# Patient Record
Sex: MALE | Race: WHITE | NOT HISPANIC OR LATINO | Employment: UNEMPLOYED | URBAN - METROPOLITAN AREA
[De-identification: names, ages, dates, MRNs, and addresses within clinical notes are randomized per-mention and may not be internally consistent; named-entity substitution may affect disease eponyms.]

---

## 2021-01-04 ENCOUNTER — OFFICE VISIT (OUTPATIENT)
Dept: FAMILY MEDICINE | Facility: CLINIC | Age: 41
End: 2021-01-04
Payer: COMMERCIAL

## 2021-01-04 VITALS
HEART RATE: 81 BPM | WEIGHT: 156 LBS | OXYGEN SATURATION: 97 % | TEMPERATURE: 97.6 F | HEIGHT: 71 IN | DIASTOLIC BLOOD PRESSURE: 68 MMHG | SYSTOLIC BLOOD PRESSURE: 122 MMHG | BODY MASS INDEX: 21.84 KG/M2

## 2021-01-04 DIAGNOSIS — Z00.00 ROUTINE GENERAL MEDICAL EXAMINATION AT A HEALTH CARE FACILITY: Primary | ICD-10-CM

## 2021-01-04 DIAGNOSIS — Z11.59 NEED FOR HEPATITIS C SCREENING TEST: ICD-10-CM

## 2021-01-04 PROCEDURE — 99385 PREV VISIT NEW AGE 18-39: CPT | Performed by: PHYSICIAN ASSISTANT

## 2021-01-04 ASSESSMENT — ENCOUNTER SYMPTOMS
WEAKNESS: 0
JOINT SWELLING: 0
PALPITATIONS: 0
FREQUENCY: 0
SORE THROAT: 0
HEMATURIA: 0
NERVOUS/ANXIOUS: 0
NAUSEA: 0
CONSTIPATION: 0
HEARTBURN: 0
HEMATOCHEZIA: 0
ABDOMINAL PAIN: 0
SHORTNESS OF BREATH: 0
CHILLS: 0
DIARRHEA: 0
PARESTHESIAS: 0
DYSURIA: 0
FEVER: 0
COUGH: 0
HEADACHES: 0
MYALGIAS: 0
EYE PAIN: 0
DIZZINESS: 0
ARTHRALGIAS: 0

## 2021-01-04 ASSESSMENT — PAIN SCALES - GENERAL: PAINLEVEL: NO PAIN (0)

## 2021-01-04 ASSESSMENT — MIFFLIN-ST. JEOR: SCORE: 1644.74

## 2021-01-04 NOTE — PROGRESS NOTES
SUBJECTIVE:   CC: Michael Thompson is an 40 year old male who presents for preventative health visit.       Patient has been advised of split billing requirements and indicates understanding: Yes  Healthy Habits:     Getting at least 3 servings of Calcium per day:  Yes    Bi-annual eye exam:  NO    Dental care twice a year:  Yes    Sleep apnea or symptoms of sleep apnea:  None    Diet:  Regular (no restrictions)    Frequency of exercise:  2-3 days/week    Duration of exercise:  30-45 minutes    Taking medications regularly:  Not Applicable    Medication side effects:  None    PHQ-2 Total Score: 0    Additional concerns today:  No        Today's PHQ-2 Score:   PHQ-2 ( 1999 [employer]) 1/4/2021   Q1: Little interest or pleasure in doing things 0   Q2: Feeling down, depressed or hopeless 0   PHQ-2 Score 0   Q1: Little interest or pleasure in doing things Not at all   Q2: Feeling down, depressed or hopeless Not at all   PHQ-2 Score 0       Abuse: Current or Past(Physical, Sexual or Emotional)- No  Do you feel safe in your environment? Yes        Social History     Tobacco Use     Smoking status: Never Smoker     Smokeless tobacco: Never Used   Substance Use Topics     Alcohol use: Yes     Comment: social     If you drink alcohol do you typically have >3 drinks per day or >7 drinks per week? No    Alcohol Use 1/4/2021   Prescreen: >3 drinks/day or >7 drinks/week? No   No flowsheet data found.    Last PSA: No results found for: PSA    Reviewed orders with patient. Reviewed health maintenance and updated orders accordingly - Yes  BP Readings from Last 3 Encounters:   01/04/21 122/68    Wt Readings from Last 3 Encounters:   01/04/21 70.8 kg (156 lb)                  There is no problem list on file for this patient.    Past Surgical History:   Procedure Laterality Date     HERNIA REPAIR       lump removal  2013    right upper back     TONSILLECTOMY, ADENOIDECTOMY, COMBINED  2004       Social History     Tobacco Use      "Smoking status: Never Smoker     Smokeless tobacco: Never Used   Substance Use Topics     Alcohol use: Yes     Comment: social     Family History   Problem Relation Age of Onset     Anxiety Disorder Mother      Prostate Cancer Maternal Grandfather      Cancer Paternal Grandfather          No current outpatient medications on file.     No Known Allergies  No lab results found.     Reviewed and updated as needed this visit by clinical staff  Tobacco  Allergies  Meds   Med Hx  Surg Hx  Fam Hx  Soc Hx        Reviewed and updated as needed this visit by Provider                History reviewed. No pertinent past medical history.   Past Surgical History:   Procedure Laterality Date     HERNIA REPAIR       lump removal  2013    right upper back     TONSILLECTOMY, ADENOIDECTOMY, COMBINED  2004       Review of Systems   Constitutional: Negative for chills and fever.   HENT: Negative for congestion, ear pain, hearing loss and sore throat.    Eyes: Negative for pain and visual disturbance.   Respiratory: Negative for cough and shortness of breath.    Cardiovascular: Negative for chest pain, palpitations and peripheral edema.   Gastrointestinal: Negative for abdominal pain, constipation, diarrhea, heartburn, hematochezia and nausea.   Genitourinary: Negative for discharge, dysuria, frequency, genital sores, hematuria, impotence and urgency.   Musculoskeletal: Negative for arthralgias, joint swelling and myalgias.   Skin: Negative for rash.   Neurological: Negative for dizziness, weakness, headaches and paresthesias.   Psychiatric/Behavioral: Negative for mood changes. The patient is not nervous/anxious.        OBJECTIVE:   /68   Pulse 81   Temp 97.6  F (36.4  C) (Tympanic)   Ht 1.803 m (5' 11\")   Wt 70.8 kg (156 lb)   SpO2 97%   BMI 21.76 kg/m      Physical Exam  GENERAL: healthy, alert and no distress  EYES: Eyes grossly normal to inspection, PERRL and conjunctivae and sclerae normal  HENT: ear canals and " "TM's normal, nose and mouth without ulcers or lesions  NECK: no adenopathy, no asymmetry, masses, or scars and thyroid normal to palpation  RESP: lungs clear to auscultation - no rales, rhonchi or wheezes  CV: regular rate and rhythm, normal S1 S2, no S3 or S4, no murmur, click or rub, no peripheral edema and peripheral pulses strong  ABDOMEN: soft, nontender, no hepatosplenomegaly, no masses and bowel sounds normal  MS: no gross musculoskeletal defects noted, no edema  SKIN: no suspicious lesions or rashes  NEURO: Normal strength and tone, mentation intact and speech normal  PSYCH: mentation appears normal, affect normal/bright    Diagnostic Test Results:  Labs reviewed in Epic    ASSESSMENT/PLAN:   1. Routine general medical examination at a health care facility  Health maintenance reviewed and updated.  - Lipid panel reflex to direct LDL Fasting; Future  - **Glucose FUTURE anytime; Future    2. Need for hepatitis C screening test  - Hepatitis C Screen Reflex to HCV RNA Quant and Genotype; Future    Patient has been advised of split billing requirements and indicates understanding: Yes  COUNSELING:   Reviewed preventive health counseling, as reflected in patient instructions       Regular exercise       Healthy diet/nutrition    Estimated body mass index is 21.76 kg/m  as calculated from the following:    Height as of this encounter: 1.803 m (5' 11\").    Weight as of this encounter: 70.8 kg (156 lb).         He reports that he has never smoked. He has never used smokeless tobacco.      Counseling Resources:  ATP IV Guidelines  Pooled Cohorts Equation Calculator  FRAX Risk Assessment  ICSI Preventive Guidelines  Dietary Guidelines for Americans, 2010  USDA's MyPlate  ASA Prophylaxis  Lung CA Screening    Kristen M. Kehr, PA-C [facility]  "

## 2021-01-04 NOTE — NURSING NOTE
"Chief Complaint   Patient presents with     Physical       Initial /68   Pulse 81   Temp 97.6  F (36.4  C) (Tympanic)   Ht 1.803 m (5' 11\")   Wt 70.8 kg (156 lb)   SpO2 97%   BMI 21.76 kg/m   Estimated body mass index is 21.76 kg/m  as calculated from the following:    Height as of this encounter: 1.803 m (5' 11\").    Weight as of this encounter: 70.8 kg (156 lb).  Medication Reconciliation: complete    LUKASZ Castle MA    "

## 2021-01-04 NOTE — PATIENT INSTRUCTIONS
Schedule fasting lab tests and immunizations            Preventive Health Recommendations  Male Ages 26 - 39    Yearly exam:             See your health care provider every year in order to  o   Review health changes.   o   Discuss preventive care.    o   Review your medicines if your doctor has prescribed any.    You should be tested each year for STDs (sexually transmitted diseases), if you re at risk.     After age 35, talk to your provider about cholesterol testing. If you are at risk for heart disease, have your cholesterol tested at least every 5 years.     If you are at risk for diabetes, you should have a diabetes test (fasting glucose).  Shots: Get a flu shot each year. Get a tetanus shot every 10 years.     Nutrition:    Eat at least 5 servings of fruits and vegetables daily.     Eat whole-grain bread, whole-wheat pasta and brown rice instead of white grains and rice.     Get adequate Calcium and Vitamin D.     Lifestyle    Exercise for at least 150 minutes a week (30 minutes a day, 5 days a week). This will help you control your weight and prevent disease.     Limit alcohol to one drink per day.     No smoking.     Wear sunscreen to prevent skin cancer.     See your dentist every six months for an exam and cleaning.

## 2021-01-15 ENCOUNTER — HEALTH MAINTENANCE LETTER (OUTPATIENT)
Age: 41
End: 2021-01-15

## 2021-01-20 DIAGNOSIS — Z11.59 NEED FOR HEPATITIS C SCREENING TEST: ICD-10-CM

## 2021-01-20 DIAGNOSIS — Z00.00 ROUTINE GENERAL MEDICAL EXAMINATION AT A HEALTH CARE FACILITY: ICD-10-CM

## 2021-01-20 LAB
CHOLEST SERPL-MCNC: 210 MG/DL
GLUCOSE SERPL-MCNC: 89 MG/DL (ref 70–99)
HCV AB SERPL QL IA: NONREACTIVE
HDLC SERPL-MCNC: 74 MG/DL
LDLC SERPL CALC-MCNC: 117 MG/DL
NONHDLC SERPL-MCNC: 136 MG/DL
TRIGL SERPL-MCNC: 94 MG/DL

## 2021-01-20 PROCEDURE — 82947 ASSAY GLUCOSE BLOOD QUANT: CPT | Performed by: PHYSICIAN ASSISTANT

## 2021-01-20 PROCEDURE — 36415 COLL VENOUS BLD VENIPUNCTURE: CPT | Performed by: PHYSICIAN ASSISTANT

## 2021-01-20 PROCEDURE — 86803 HEPATITIS C AB TEST: CPT | Performed by: PHYSICIAN ASSISTANT

## 2021-01-20 PROCEDURE — 80061 LIPID PANEL: CPT | Performed by: PHYSICIAN ASSISTANT

## 2021-05-04 ENCOUNTER — TELEPHONE (OUTPATIENT)
Dept: NURSING | Facility: CLINIC | Age: 41
End: 2021-05-04

## 2021-05-04 NOTE — TELEPHONE ENCOUNTER
Patient calls with questions regarding covid-19 antibody testing. Patient is wondering if it is offered at [facility]. Patient is wondering if he had covid-19 in the past, does not currently have symptoms.   Patient is advised that the testing can be ordered, there also is a retail lab. Patient is going to speak with his insurance to see if it is covered and call back if he would like to have the order placed. Patient denies further questions at this time.    Margaret Patel RN  [facility]

## 2021-05-24 ENCOUNTER — IMMUNIZATION (OUTPATIENT)
Dept: LAB | Facility: CLINIC | Age: 41
End: 2021-05-24
Payer: COMMERCIAL

## 2021-09-24 ENCOUNTER — OFFICE VISIT (OUTPATIENT)
Dept: FAMILY MEDICINE | Facility: CLINIC | Age: 41
End: 2021-09-24
Payer: COMMERCIAL

## 2021-09-24 VITALS
HEIGHT: 71 IN | SYSTOLIC BLOOD PRESSURE: 126 MMHG | RESPIRATION RATE: 14 BRPM | DIASTOLIC BLOOD PRESSURE: 73 MMHG | WEIGHT: 156 LBS | HEART RATE: 84 BPM | TEMPERATURE: 97.8 F | OXYGEN SATURATION: 100 % | BODY MASS INDEX: 21.84 KG/M2

## 2021-09-24 DIAGNOSIS — R22.1 LOCALIZED SWELLING, MASS AND LUMP, NECK: Primary | ICD-10-CM

## 2021-09-24 LAB
BASOPHILS # BLD AUTO: 0 10E3/UL (ref 0–0.2)
BASOPHILS NFR BLD AUTO: 1 %
EOSINOPHIL # BLD AUTO: 0.2 10E3/UL (ref 0–0.7)
EOSINOPHIL NFR BLD AUTO: 3 %
ERYTHROCYTE [DISTWIDTH] IN BLOOD BY AUTOMATED COUNT: 12.6 % (ref 10–15)
HCT VFR BLD AUTO: 46.2 % (ref 40–53)
HGB BLD-MCNC: 16.1 G/DL (ref 13.3–17.7)
LYMPHOCYTES # BLD AUTO: 1.7 10E3/UL (ref 0.8–5.3)
LYMPHOCYTES NFR BLD AUTO: 32 %
MCH RBC QN AUTO: 30.2 PG (ref 26.5–33)
MCHC RBC AUTO-ENTMCNC: 34.8 G/DL (ref 31.5–36.5)
MCV RBC AUTO: 87 FL (ref 78–100)
MONOCYTES # BLD AUTO: 0.4 10E3/UL (ref 0–1.3)
MONOCYTES NFR BLD AUTO: 7 %
NEUTROPHILS # BLD AUTO: 2.9 10E3/UL (ref 1.6–8.3)
NEUTROPHILS NFR BLD AUTO: 57 %
PLATELET # BLD AUTO: 187 10E3/UL (ref 150–450)
RBC # BLD AUTO: 5.33 10E6/UL (ref 4.4–5.9)
WBC # BLD AUTO: 5.1 10E3/UL (ref 4–11)

## 2021-09-24 PROCEDURE — 36415 COLL VENOUS BLD VENIPUNCTURE: CPT | Performed by: PHYSICIAN ASSISTANT

## 2021-09-24 PROCEDURE — 99214 OFFICE O/P EST MOD 30 MIN: CPT | Performed by: PHYSICIAN ASSISTANT

## 2021-09-24 PROCEDURE — 85025 COMPLETE CBC W/AUTO DIFF WBC: CPT | Performed by: PHYSICIAN ASSISTANT

## 2021-09-24 ASSESSMENT — MIFFLIN-ST. JEOR: SCORE: 1644.74

## 2021-09-24 NOTE — PROGRESS NOTES
Assessment & Plan     Localized swelling, mass and lump, neck  Feels consistent with lymph node but could be cyst or other  Will get US and cbc and determine f/u from there  If normal appearing lymph node would wait 4 weeks and if still not gone refer to ENT or consider ct scan  Cbc pending    - US Head Neck Soft Tissue; Future  - CBC with platelets and differential; Future  - CBC with platelets and differential  Patient Instructions   Call [facility] to schedule us of neck [phone]           Return in about 4 weeks (around 10/22/2021) for if not improving.    ALEX Perkins [facility]    Lucy Rodriguez is a 40 year old who presents for the following health issues  accompanied by his Self:    HPI     Acute Illness  Acute illness concerns: throat issue?  Onset/Duration: x 2-3 weeks  Symptoms:  Fever: no  Chills/Sweats: no  Headache (location?): no  Sinus Pressure: no  Conjunctivitis:  no  Ear Pain: no  Rhinorrhea: no  Congestion: no  Sore Throat: Per pt feels like the R side of the lymph nodes is enlarged.  Cough: no  Wheeze: no  Decreased Appetite: no  Nausea: no  Vomiting: no  Diarrhea: no  Dysuria/Freq.: no  Dysuria or Hematuria: no  Fatigue/Achiness: no  Sick/Strep Exposure: no  Therapies tried and outcome: None        Tender swollen lymph node right anterior neck x 3 weeks. Was more tender at first and possibly bigger. Had bilateral neck nodes swell up initially but this one just has not gone down so he is concerned.  Tender to touch.  Feels like there might be something under it.  Sometimes can feel it when he swallows.   No fevers, chills, or cough. Was not ill that he was aware when this all started.  No recent shots before this started either. Vitals normal today.     Review of Systems   Constitutional, HEENT, cardiovascular, pulmonary, GI, , musculoskeletal, neuro, skin, endocrine and psych systems are negative, except as otherwise noted.     "  Objective    /73   Pulse 84   Temp 97.8  F (36.6  C) (Tympanic)   Resp 14   Ht 1.803 m (5' 11\")   Wt 70.8 kg (156 lb)   SpO2 100%   BMI 21.76 kg/m    Body mass index is 21.76 kg/m .  Physical Exam   GENERAL: healthy, alert and no distress  NECK: {:thyroid feels normal. R anterior cervical chain there is a tender medium sized mobile firm mass noted.   RESP: lungs clear to auscultation - no rales, rhonchi or wheezes  CV: regular rate and rhythm, normal S1 S2, no S3 or S4, no murmur, click or rub, no peripheral edema and peripheral pulses strong  MS: no gross musculoskeletal defects noted, no edema  NEURO: Normal strength and tone, mentation intact and speech normal  PSYCH: mentation appears normal, affect normal/bright    Labs-pending          "

## 2021-09-24 NOTE — RESULT ENCOUNTER NOTE
Nicolasa Rodriguez,       Your recent test results are attached, if you have any questions or concerns please feel free to contact me via e-mail or call [phone].  Good news, normal white and red blood cell counts. Nothing concerning seen.        It was a pleasure to see you at your recent office visit.      Sincerely,  Heather Shahid PA-C

## 2021-09-28 ENCOUNTER — ANCILLARY PROCEDURE (OUTPATIENT)
Dept: ULTRASOUND IMAGING | Facility: CLINIC | Age: 41
End: 2021-09-28
Attending: PHYSICIAN ASSISTANT
Payer: COMMERCIAL

## 2021-09-28 DIAGNOSIS — R22.1 NECK MASS: Primary | ICD-10-CM

## 2021-09-28 DIAGNOSIS — R22.1 LOCALIZED SWELLING, MASS AND LUMP, NECK: ICD-10-CM

## 2021-09-28 LAB — RADIOLOGIST FLAGS: NORMAL

## 2021-09-28 PROCEDURE — 76536 US EXAM OF HEAD AND NECK: CPT | Performed by: RADIOLOGY

## 2021-09-28 NOTE — RESULT ENCOUNTER NOTE
PLEASE CALL PATIENT:  Dear Michael,      It was a pleasure to see you at your recent office visit.  Your test results are listed below.  US shows a complex cystic mass in area of concern. US is not able to give more detail than that, therefore next step is a CT with IV contrast to get more detail. It could be nothing of concern, but we need to get further imaging to get further details. Order is placed please schedule this and I will follow up after results come back.   Call [facility] to schedule [phone]           If you have any questions or concerns, please call the clinic at [phone].    Sincerely,  Heather Shahid PA-C

## 2021-09-29 ENCOUNTER — ANCILLARY PROCEDURE (OUTPATIENT)
Dept: CT IMAGING | Facility: CLINIC | Age: 41
End: 2021-09-29
Attending: PHYSICIAN ASSISTANT
Payer: COMMERCIAL

## 2021-09-29 DIAGNOSIS — R22.1 NECK MASS: ICD-10-CM

## 2021-09-29 PROCEDURE — 70491 CT SOFT TISSUE NECK W/DYE: CPT | Mod: TC | Performed by: RADIOLOGY

## 2021-09-29 RX ORDER — IOPAMIDOL 755 MG/ML
80 INJECTION, SOLUTION INTRAVASCULAR ONCE
Status: COMPLETED | OUTPATIENT
Start: 2021-09-29 | End: 2021-09-29

## 2021-09-29 RX ADMIN — IOPAMIDOL 80 ML: 755 INJECTION, SOLUTION INTRAVASCULAR at 09:21

## 2021-09-30 ENCOUNTER — TELEPHONE (OUTPATIENT)
Dept: FAMILY MEDICINE | Facility: CLINIC | Age: 41
End: 2021-09-30

## 2021-09-30 DIAGNOSIS — R22.1 NECK MASS: Primary | ICD-10-CM

## 2021-09-30 NOTE — TELEPHONE ENCOUNTER
PLEASE CALL PATIENT:   Dear Michael,       It was a pleasure to see you at your recent office visit.  Your test results are listed below.  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  Follow up with ENT however is still recommended, they may want to biopsy it to make sure the CT is correct. I have referred you to them please schedule this.           If you have any questions or concerns, please call the clinic at [phone].     Sincerely,   Heather Shahid PA-C

## 2021-09-30 NOTE — TELEPHONE ENCOUNTER
Left message on answering machine for patient to call back to [phone].    RN please give patient provider message as written.  Lyudmila BOSEN, RN

## 2021-09-30 NOTE — TELEPHONE ENCOUNTER
Pt notified of provider message as written.  Pt verbalized good understanding.  Pt transferred to scheduling line to make ENT appointment.   Lyudmila BOSEN, RN

## 2021-09-30 NOTE — RESULT ENCOUNTER NOTE
PLEASE CALL PATIENT:  Dear Michael,      It was a pleasure to see you at your recent office visit.  Your test results are listed below.  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  Follow up with ENT however is still recommended, they may want to biopsy it to make sure the CT is correct. I have referred you to them please schedule this.           If you have any questions or concerns, please call the clinic at [phone].    Sincerely,  Heather Shahid PA-C

## 2021-10-01 ENCOUNTER — OFFICE VISIT (OUTPATIENT)
Dept: OTOLARYNGOLOGY | Facility: CLINIC | Age: 41
End: 2021-10-01
Payer: COMMERCIAL

## 2021-10-01 VITALS
RESPIRATION RATE: 18 BRPM | HEART RATE: 97 BPM | SYSTOLIC BLOOD PRESSURE: 122 MMHG | DIASTOLIC BLOOD PRESSURE: 67 MMHG | OXYGEN SATURATION: 99 %

## 2021-10-01 DIAGNOSIS — Q89.2 THYROGLOSSAL DUCT CYST: Primary | ICD-10-CM

## 2021-10-01 PROCEDURE — 99203 OFFICE O/P NEW LOW 30 MIN: CPT | Performed by: OTOLARYNGOLOGY

## 2021-10-01 NOTE — PROGRESS NOTES
I am seeing this patient in consultation for neck mass at the request of the provider Heather Shahid.    Chief Complaint - Neck mass    History of Present Illness - Michael Thompson is a 40 year old male with a central neck mass. It has been present for 3 weeks. He has some throat pressure with swallowing. He had this start with an upper respiratory infection. He can feel a change in swallowing in that things go down more on the left now. No true solid food dysphagia. The patient notes no tenderness or pain, but it is uncomfortable. No infection symptoms of the mass. The patient doesn't smoke. I personally reviewed the relevant clinical notes in Epic including the primary care providers note. Ultrasound neck 9/28/21 showed 3.3 x 1.7 x 1.4 cm midline neck mass, cystic. 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 CBC on 9/24/2021 was reviewed and was normal.     Past Medical History - healthy    Allergies - No Known Allergies    Social History -   Social History     Socioeconomic History     Marital status: Single     Spouse name: Not on file     Number of children: Not on file     Years of education: Not on file     Highest education level: Not on file   Occupational History     Not on file   Tobacco Use     Smoking status: Never Smoker     Smokeless tobacco: Never Used   Substance and Sexual Activity     Alcohol use: Yes     Comment: social     Drug use: Never     Sexual activity: Yes     Partners: Female     Birth control/protection: None   Other Topics Concern     Not on file   Social History Narrative     Not on file     Social Determinants of Health     Financial Resource Strain:      Difficulty of Paying Living Expenses:    Food Insecurity:      Worried About Running Out of Food in the Last Year:      Ran Out of Food in the Last Year:    Transportation Needs:      Lack of Transportation (Medical):      Lack of  Transportation (Non-Medical):    Physical Activity:      Days of Exercise per Week:      Minutes of Exercise per Session:    Stress:      Feeling of Stress :    Social Connections:      Frequency of Communication with Friends and Family:      Frequency of Social Gatherings with Friends and Family:      Attends Taoist Services:      Active Member of Clubs or Organizations:      Attends Club or Organization Meetings:      Marital Status:    Intimate Partner Violence:      Fear of Current or Ex-Partner:      Emotionally Abused:      Physically Abused:      Sexually Abused:        Family History -   Family History   Problem Relation Age of Onset     Anxiety Disorder Mother      Prostate Cancer Maternal Grandfather      Cancer Paternal Grandfather      Review of Systems - As per HPI and PMHx, otherwise 7 system review of the head and neck is negative.    Physical Exam  /67   Pulse 97   Resp 18   SpO2 99%   General - The patient is in no distress.  Alert and oriented x3, answers questions and cooperates with examination appropriately.   Voice and Breathing - The patient was breathing comfortably without the use of accessory muscles. There was no wheezing, stridor, or stertor.  The patients voice was clear and strong.  Eyes - Extraocular movements intact. Sclera were not icteric or injected, conjunctiva were pink and moist.  Neurologic - Cranial nerves II-XII are grossly intact. Specifically, the facial nerve is intact, House-Brackmann grade 1 of 6.   Mouth - Examination of the oral cavity showed pink, healthy oral mucosa. No lesions or ulcerations noted.  The tongue was mobile and protrudes midline, no lesions.  Oropharynx - The walls of the oropharynx were smooth, symmetric, and had no lesions or ulcerations.  The tonsils were without masses or ulcerations. The uvula was midline and the palate raised symmetrically.   Neck - Palpation of the right central neck reveals a 2 cm mass in the right paramedian area  over the thyroid ala and in the thyrohyoid space. No tenderness. No overlying skin changes. No fluctuance. The other occipital, submental, submandibular, internal jugular chain, and supraclavicular chains did not demonstrate any abnormal lymph nodes or masses. No parotid masses. Palpation of the thyroid was soft and smooth, with no nodules or goiter appreciated.  The trachea was midline.    A/P - Michael Thompson is a 40 year old male with a neck mass to the right of midline.  Ultrasound and CT neck have been performed showing a cystic mass in the right paralaryngeal and thyrohyoid space.  He also has an adjacent laryngocele.  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  It is not infected.  We discussed options including surgical removal versus observation.  I will refer him to the [facility] ENT department to discuss surgery in detail.       Олег Whalen MD  Otolaryngology  [facility]

## 2021-10-01 NOTE — LETTER
10/1/2021         RE: Michael Thompson  [address] [facility] [facility] [address]        Dear Colleague,    Thank you for referring your patient, Michael Thompson, to the [facility]. Please see a copy of my visit note below.    I am seeing this patient in consultation for neck mass at the request of the provider Heather Shahid.    Chief Complaint - Neck mass    History of Present Illness - Michael Thompson is a 40 year old male with a central neck mass. It has been present for 3 weeks. He has some throat pressure with swallowing. He had this start with an upper respiratory infection. He can feel a change in swallowing in that things go down more on the left now. No true solid food dysphagia. The patient notes no tenderness or pain, but it is uncomfortable. No infection symptoms of the mass. The patient doesn't smoke. I personally reviewed the relevant clinical notes in Epic including the primary care providers note. Ultrasound neck 9/28/21 showed 3.3 x 1.7 x 1.4 cm midline neck mass, cystic. 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 CBC on 9/24/2021 was reviewed and was normal.     Past Medical History - healthy    Allergies - No Known Allergies    Social History -   Social History     Socioeconomic History     Marital status: Single     Spouse name: Not on file     Number of children: Not on file     Years of education: Not on file     Highest education level: Not on file   Occupational History     Not on file   Tobacco Use     Smoking status: Never Smoker     Smokeless tobacco: Never Used   Substance and Sexual Activity     Alcohol use: Yes     Comment: social     Drug use: Never     Sexual activity: Yes     Partners: Female     Birth control/protection: None   Other Topics Concern     Not on file   Social History Narrative     Not on file     Social Determinants of Health      Financial Resource Strain:      Difficulty of Paying Living Expenses:    Food Insecurity:      Worried About Running Out of Food in the Last Year:      Ran Out of Food in the Last Year:    Transportation Needs:      Lack of Transportation (Medical):      Lack of Transportation (Non-Medical):    Physical Activity:      Days of Exercise per Week:      Minutes of Exercise per Session:    Stress:      Feeling of Stress :    Social Connections:      Frequency of Communication with Friends and Family:      Frequency of Social Gatherings with Friends and Family:      Attends [employer]ian Services:      Active Member of Clubs or Organizations:      Attends Club or Organization Meetings:      Marital Status:    Intimate Partner Violence:      Fear of Current or Ex-Partner:      Emotionally Abused:      Physically Abused:      Sexually Abused:        Family History -   Family History   Problem Relation Age of Onset     Anxiety Disorder Mother      Prostate Cancer Maternal Grandfather      Cancer Paternal Grandfather      Review of Systems - As per HPI and PMHx, otherwise 7 system review of the head and neck is negative.    Physical Exam  /67   Pulse 97   Resp 18   SpO2 99%   General - The patient is in no distress.  Alert and oriented x3, answers questions and cooperates with examination appropriately.   Voice and Breathing - The patient was breathing comfortably without the use of accessory muscles. There was no wheezing, stridor, or stertor.  The patients voice was clear and strong.  Eyes - Extraocular movements intact. Sclera were not icteric or injected, conjunctiva were pink and moist.  Neurologic - Cranial nerves II-XII are grossly intact. Specifically, the facial nerve is intact, House-Brackmann grade 1 of 6.   Mouth - Examination of the oral cavity showed pink, healthy oral mucosa. No lesions or ulcerations noted.  The tongue was mobile and protrudes midline, no lesions.  Oropharynx - The walls of the  oropharynx were smooth, symmetric, and had no lesions or ulcerations.  The tonsils were without masses or ulcerations. The uvula was midline and the palate raised symmetrically.   Neck - Palpation of the right central neck reveals a 2 cm mass in the right paramedian area over the thyroid ala and in the thyrohyoid space. No tenderness. No overlying skin changes. No fluctuance. The other occipital, submental, submandibular, internal jugular chain, and supraclavicular chains did not demonstrate any abnormal lymph nodes or masses. No parotid masses. Palpation of the thyroid was soft and smooth, with no nodules or goiter appreciated.  The trachea was midline.    A/P - Michael Thompson is a 40 year old male with a neck mass to the right of midline.  Ultrasound and CT neck have been performed showing a cystic mass in the right paralaryngeal and thyrohyoid space.  He also has an adjacent laryngocele.  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  It is not infected.  We discussed options including surgical removal versus observation.  I will refer him to the [facility] ENT department to discuss surgery in detail.       Олег Whalen MD  Otolaryngology  [facility]        Again, thank you for allowing me to participate in the care of your patient.        Sincerely,        Олег Whalen MD

## 2021-10-06 ENCOUNTER — TELEPHONE (OUTPATIENT)
Dept: OTOLARYNGOLOGY | Facility: CLINIC | Age: 41
End: 2021-10-06

## 2021-10-06 NOTE — TELEPHONE ENCOUNTER
Left message for patient in regards to scheduling appt with laryngologist as per message from . pt is currently scheduled with  which is incorrect since he is not a surgeon and pt is looking to have cyst removed. Writers call back number provided on vm.

## 2021-10-08 NOTE — TELEPHONE ENCOUNTER
FUTURE VISIT INFORMATION      FUTURE VISIT INFORMATION:    Date: 11/23/21    Time: 10 AM    [place] [facility]  REFERRAL INFORMATION:    Referring provider: Dr. 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 Whalen  9/24/21 - [facility] 9/29/21 - CT Neck  9/28/21 - US Head Neck [facility] 11/25/13 - GI OV with Iman Galarza NP [facility] Everywhere

## 2021-11-19 ENCOUNTER — TELEPHONE (OUTPATIENT)
Dept: OTOLARYNGOLOGY | Facility: CLINIC | Age: 41
End: 2021-11-19
Payer: COMMERCIAL

## 2021-11-19 NOTE — TELEPHONE ENCOUNTER
Writer attempted to contact patient to change a virtual appointment with DR. Kidd 11/23/21 10:00 AM to 7:00 AM. Someone names Orestes stated we have the wrong number. Writer left detailed MyChart message for the patient.    Bakari Dudely, EMT

## 2021-11-22 NOTE — TELEPHONE ENCOUNTER
Spoke to patient regarding appointment tomorrow with Dr. Kidd. Offered patient an in person appointment at 9am. Patient in agreement to this and appointment switched.    Annelise Nails RN on 11/22/2021 at 12:59 PM

## 2021-11-22 NOTE — TELEPHONE ENCOUNTER
Attempted to reach patient. Patient did not answer and no voicemail box available.     Annelise Nails RN on 11/22/2021 at 12:49 PM

## 2021-11-23 ENCOUNTER — PRE VISIT (OUTPATIENT)
Dept: OTOLARYNGOLOGY | Facility: CLINIC | Age: 41
End: 2021-11-23

## 2021-11-23 ENCOUNTER — VIRTUAL VISIT (OUTPATIENT)
Dept: OTOLARYNGOLOGY | Facility: CLINIC | Age: 41
End: 2021-11-23
Payer: COMMERCIAL

## 2021-11-23 ENCOUNTER — ALLIED HEALTH/NURSE VISIT (OUTPATIENT)
Dept: SPEECH THERAPY | Facility: CLINIC | Age: 41
End: 2021-11-23

## 2021-11-23 VITALS — HEIGHT: 71 IN | BODY MASS INDEX: 21.7 KG/M2 | WEIGHT: 155 LBS | OXYGEN SATURATION: 97 % | HEART RATE: 90 BPM

## 2021-11-23 DIAGNOSIS — Q89.2 THYROGLOSSAL DUCT CYST: Primary | ICD-10-CM

## 2021-11-23 DIAGNOSIS — Q31.3 LARYNGOCELE: ICD-10-CM

## 2021-11-23 DIAGNOSIS — R22.1 LOCALIZED SWELLING, MASS AND LUMP, NECK: Primary | ICD-10-CM

## 2021-11-23 PROCEDURE — 31575 DIAGNOSTIC LARYNGOSCOPY: CPT | Performed by: OTOLARYNGOLOGY

## 2021-11-23 PROCEDURE — 99213 OFFICE O/P EST LOW 20 MIN: CPT | Mod: 25 | Performed by: OTOLARYNGOLOGY

## 2021-11-23 ASSESSMENT — PAIN SCALES - GENERAL: PAINLEVEL: NO PAIN (0)

## 2021-11-23 ASSESSMENT — MIFFLIN-ST. JEOR: SCORE: 1635.21

## 2021-11-23 NOTE — PROGRESS NOTES
SLP: Pt seen for brief clinical intervention in conjunction with his ENT clinic visit.  Pt reports a near constant mild discomfort as well as near constant popping and clicking with swallowing which began at onset of growth ~2 months' ago.  Pt reports no change with intake or in voice.  Pt continues to eat a regular texture diet and thin liquids with report of no overt s/s of aspiration.  Pt has had no weight loss.  Larynx visualized with ENT MD without excessive pooling of secretions observed.  FEES deferred due to lack of symptomology.      No charge   Total Time: 20 minutes

## 2021-11-23 NOTE — PATIENT INSTRUCTIONS
1.  You were seen in the ENT Clinic today by Dr. Kidd.     2. Your next steps:   - US guided FNA exam    3.  Plan is to return to have a virtual visit after US FNA.    If you have any questions or concerns after your appointment, please call the clinic:   - Clinic phone: [phone].   - Annelise (Dr. Kidd's nurse): [phone]    Annelise Nails RN, BSN  [facility]  Department of Otolaryngology  [facility]  [email or web address]

## 2021-11-23 NOTE — LETTER
2021       RE: Michael Thompson  [address]  [place] [address]     Dear Colleague,    Thank you for referring your patient, Michael Thompson, to the [facility]IC [place] at [facility]. Please see a copy of my visit note below.        [facility]   at the [facility]   Otolaryngology Clinic     Patient: Michael Thompson    MRN: [id number]    : 1980    Age/Gender: 41 year old male  Date of Service: 2021  Rendering Provider:   Janet Kidd MD     Referring Provider   PCP: No Ref-Primary, Physician  Referring Physician: Dr. Олег Whalen  Reason for Consultation   Neck mass  History   HISTORY OF PRESENT ILLNESS: I was asked to consult on Michael Thompson, by Dr. Олег Whalen for evaluation of *** . Mr. Thompson is a 41 year old male who presents to us today with ***.      Of note ***    he presents today for evaluation. he reports:    Dysphonia  - ***    Dysphagia  - ***    Dyspnea  - ***    Throat clearing/cough  - ***    GERD  - ***    LPRD   - ***    PAST MEDICAL HISTORY: No past medical history on file.    PAST SURGICAL HISTORY:   Past Surgical History:   Procedure Laterality Date     HERNIA REPAIR       lump removal      right upper back     TONSILLECTOMY, ADENOIDECTOMY, COMBINED         CURRENT MEDICATIONS: No current outpatient medications on file.    ALLERGIES: Patient has no known allergies.    SOCIAL HISTORY:    Social History     Socioeconomic History     Marital status: Single     Spouse name: Not on file     Number of children: Not on file     Years of education: Not on file     Highest education level: Not on file   Occupational History     Not on file   Tobacco Use     Smoking status: Never Smoker     Smokeless tobacco: Never Used   Substance and Sexual Activity     Alcohol use: Yes     Comment: social     Drug use: Never     Sexual activity:  Yes     Partners: Female     Birth control/protection: None   Other Topics Concern     Not on file   Social History Narrative     Not on file     Social Determinants of Health     Financial Resource Strain: Not on file   Food Insecurity: Not on file   Transportation Needs: Not on file   Physical Activity: Not on file   Stress: Not on file   Social Connections: Not on file   Intimate Partner Violence: Not on file   Housing Stability: Not on file         FAMILY HISTORY:   Family History   Problem Relation Age of Onset     Anxiety Disorder Mother      Prostate Cancer Maternal Grandfather      Cancer Paternal Grandfather      Non-contributory for problems with anesthesia    REVIEW OF SYSTEMS:   The patient was asked a 14 point review of systems regarding constitutional symptoms, eye symptoms, ears, nose, mouth, throat symptoms, cardiovascular symptoms, respiratory symptoms, gastrointestinal symptoms, genitourinary symptoms, musculoskeletal symptoms, integumentary symptoms, neurological symptoms, psychiatric symptoms, endocrine symptoms, hematologic/lymphatic symptoms, and allergic/ immunologic symptoms.   The pertinent factors have been included in the HPI and below.  Patient Supplied Answers to Review of Systems  No flowsheet data found.    Physical Examination   The patient underwent a physical examination as described below. The pertinent positive and negative findings are summarized after the description of the examination.  Constitutional: The patient's developmental and nutritional status was assessed. The patient's voice quality was assessed.  Head and Face: The head and face were inspected for deformities. The sinuses were palpated. The salivary glands were palpated. Facial muscle strength was assessed bilaterally.  Eyes: Extraocular movements and primary gaze alignment were assessed.  Ears, Nose, Mouth and Throat: The ears and nose were examined for deformities. The nasal septum, mucosa, and turbinates were  inspected by anterior rhinoscopy. The lips, teeth, and gums were examined for abnormalities. The oral mucosa, tongue, palate, tonsils, lateral and posterior pharynx were inspected for the presence of asymmetry or mucosal lesions.    Neck: The tracheal position was noted, and the neck mass palpated to determine if there were any asymmetries, abnormal neck masses, thyromegally, or thyroid nodules.  Respiratory: The nature of the breathing and chest expansion/symmetry was observed.  Cardiovascular: The patient was examined to determine the presence of any edema or jugular venous distension.  Abdomen: The contour of the abdomen was noted.  Lymphatic: The patient was examined for infraclavicular lymphadenopathy.  Musculoskeletal: The patient was inspected for the presence of skeletal deformities.  Extremities: The extremities were examined for any clubbing or cyanosis.  Skin: The skin was examined for inflammatory or neoplastic conditions.  Neurologic: The patient's orientation, mood, and affect were noted. The cranial nerve  functions were examined.  Other pertinent positive and negative findings on physical examination:      OC/OP: no lesions, uvula midline, soft palate elevates symmetrically, FOM/BOT soft  Neck: no lesions, no TH tenderness to palpation  ***  All other physical examination findings were within normal limits and noncontributory.  Procedures   ***    Review of Relevant Clinical Data   Notes: Luis E Whalen 10/1/21  Radiology: CT neck 9/29/21           Labs:  No results found for: TSH  No results found for: NA, CO2, BUN, CREAT, GLUCOSE, PHOS  Lab Results   Component Value Date    WBC 5.1 09/24/2021    HGB 16.1 09/24/2021    HCT 46.2 09/24/2021    MCV 87 09/24/2021     09/24/2021     No results found for: PT, PTT, INR  No results found for: CORWIN  No components found for: RHEUMATOIDFACTOR,  RF  No results found for: CRP  No components found for: CKTOT, URICACID  No components found for: C3, C4, DSDNAAB,  NDNAABIFA  No results found for: MPOAB    Patient reported Quality of Life (QOL) Measures   Patient Supplied Answers To VHI Questionnaire  No flowsheet data found.      Patient Supplied Answers To EAT Questionnaire  No flowsheet data found.      Patient Supplied Answers To CSI Questionnaire  No flowsheet data found.      Patient Supplied Answers to Dyspnea Index Questionnaire:  No flowsheet data found.    Impression & Plan     IMPRESSION: Mr. Thompson is a 41 year old male who is being seen for the followin. Neck mass  - ***  - CT neck 21 with midline neck mass and right mixed laryngocele  - ***    Plan  -     RETURN VISIT: ***    Thank you for the kind referral and for allowing me to share in the care of Mr. Thompson. If you have any questions, please do not hesitate to contact me.    Janet Kidd MD    Laryngology    [facility]  Department of  Otolaryngology - Head and Neck Surgery  Clinics & Surgery Center  [address] [place] [address]  Appointment line: [phone]  Fax: [phone]  [email or web address]    *** minutes spent on the date of the encounter doing { E&M time in:070894}         Again, thank you for allowing me to participate in the care of your patient.      Sincerely,    Janet Kidd MD

## 2021-11-23 NOTE — PROGRESS NOTES
[facility]   at the [facility]   Otolaryngology Clinic     Patient: Michael Thompson    MRN: [id number]    : 1980    Age/Gender: 41 year old male  Date of Service: 2021  Rendering Provider:   Janet Kidd MD     Referring Provider   PCP: Heather Shahid PA-C  Referring Physician: Dr. Олег Whalen  Reason for Consultation   Neck mass  History   HISTORY OF PRESENT ILLNESS: I was asked to consult on Michael Thompson, by Dr. Олег Whalen for evaluation of neck mass . Mr. Thompson is a 41 year old male who presents to us today.    Of note, the patient underwent tonsillectomy at age 23. This was done for recurrent infection and swelling. He underwent many rounds of antibiotics and were eventually unable to determine the etiology of this.    he presents today for evaluation. he reports:    Dysphonia  - denies    Dysphagia  - feels clicks when he swallows  - also feels popping when he swallows  - can hold the side of his neck to help with clicking and popping  - is very new for him  - started at the same time as the swelling    Dyspnea  - inhalers no  - asthma- thinks he had this  - breathing cold air is hard for him  - otherwise denies    Throat clearing/cough  - has had to more since tonsillectomy  - otherwise denies denies    GERD/LPRD   - denies    Neck mass  - right lymph node in his neck got big when he was sick  - did not decrease in size days after be felt better  - this prompted him to get workup done  - was about 2 months ago  - still feels large  - also tenderness  - applied pressure on items around it  - felt like someone was pressing on his artery  - caused dull pulsing pain in the right side  - also causes nausea  - can occur with activity or rest  - can last for 1-6 hours at a time  - goes away spontaneously    PAST MEDICAL HISTORY: No past medical history on file.    PAST SURGICAL HISTORY:   Past Surgical History:   Procedure Laterality Date     HERNIA  REPAIR       lump removal  2013    right upper back     TONSILLECTOMY, ADENOIDECTOMY, COMBINED  2004     CURRENT MEDICATIONS: No current outpatient medications on file.    ALLERGIES: Patient has no known allergies.    SOCIAL HISTORY:    Social History     Socioeconomic History     Marital status: Single     Spouse name: Not on file     Number of children: Not on file     Years of education: Not on file     Highest education level: Not on file   Occupational History     Not on file   Tobacco Use     Smoking status: Never Smoker     Smokeless tobacco: Never Used   Substance and Sexual Activity     Alcohol use: Yes     Comment: social     Drug use: Never     Sexual activity: Yes     Partners: Female     Birth control/protection: None   Other Topics Concern     Not on file   Social History Narrative     Not on file     Social Determinants of Health     Financial Resource Strain: Not on file   Food Insecurity: Not on file   Transportation Needs: Not on file   Physical Activity: Not on file   Stress: Not on file   Social Connections: Not on file   Intimate Partner Violence: Not on file   Housing Stability: Not on file       FAMILY HISTORY:   Family History   Problem Relation Age of Onset     Anxiety Disorder Mother      Prostate Cancer Maternal Grandfather      Cancer Paternal Grandfather      Non-contributory for problems with anesthesia    REVIEW OF SYSTEMS:   The patient was asked a 14 point review of systems regarding constitutional symptoms, eye symptoms, ears, nose, mouth, throat symptoms, cardiovascular symptoms, respiratory symptoms, gastrointestinal symptoms, genitourinary symptoms, musculoskeletal symptoms, integumentary symptoms, neurological symptoms, psychiatric symptoms, endocrine symptoms, hematologic/lymphatic symptoms, and allergic/ immunologic symptoms.   The pertinent factors have been included in the HPI and below.  Patient Supplied Answers to Review of Systems  No flowsheet data found.    Physical  Examination   The patient underwent a physical examination as described below. The pertinent positive and negative findings are summarized after the description of the examination.  Constitutional: The patient's developmental and nutritional status was assessed. The patient's voice quality was assessed.  Head and Face: The head and face were inspected for deformities. The sinuses were palpated. The salivary glands were palpated. Facial muscle strength was assessed bilaterally.  Eyes: Extraocular movements and primary gaze alignment were assessed.  Ears, Nose, Mouth and Throat: The ears and nose were examined for deformities. The nasal septum, mucosa, and turbinates were inspected by anterior rhinoscopy. The lips, teeth, and gums were examined for abnormalities. The oral mucosa, tongue, palate, tonsils, lateral and posterior pharynx were inspected for the presence of asymmetry or mucosal lesions.    Neck: The tracheal position was noted, and the neck mass palpated to determine if there were any asymmetries, abnormal neck masses, thyromegally, or thyroid nodules.  Respiratory: The nature of the breathing and chest expansion/symmetry was observed.  Cardiovascular: The patient was examined to determine the presence of any edema or jugular venous distension.  Abdomen: The contour of the abdomen was noted.  Lymphatic: The patient was examined for infraclavicular lymphadenopathy.  Musculoskeletal: The patient was inspected for the presence of skeletal deformities.  Extremities: The extremities were examined for any clubbing or cyanosis.  Skin: The skin was examined for inflammatory or neoplastic conditions.  Neurologic: The patient's orientation, mood, and affect were noted. The cranial nerve  functions were examined.  Other pertinent positive and negative findings on physical examination:      OC/OP: no lesions   Neck: no lesions, right neck mass between hyoid and thyroid  All other physical examination findings were  within normal limits and noncontributory.  Procedures   Flexible laryngoscopy (CPT 05933)      Pre-procedure diagnosis: dysphagia  Post-procedure diagnosis: same as above  Indication for procedure: Mr. Thompson is a 41 year old male with see above  Procedure(s): Fiberoptic Laryngoscopy    Details of Procedure: After informed consent was obtained, the patient was seated in the examination chair.  The areas of the nasopharynx as well as the hypopharynx were anesthetized with topical 4% lidocaine with 0.25% phenylephrine atomizer.  Examination of the base of tongue was performed first.  Attention was directed to any evidence of masses in the area or evidence of leukoplakia or candidal infection.  Attention was directed to the epiglottis where its size and position was determined and its movement on phonation of the vowel  e .  The piriform sinuses were then inspected for any mass lesions or pooling of secretions.  Attention was then directed to the larynx. The vocal folds were inspected for infection or any areas of leukoplakia, for masses, polypoid degeneration, or hemorrhage.  Having done this, the arytenoids and vocal processes were inspected for erythema or evidence of granuloma formation.  The posterior commissure was then inspected for evidence of inflammatory changes in the mucosa and heaping up of mucosal tissue. The patient was then instructed to say the vowel  e .  Adduction of vocal folds to the midline was observed for any evidence of paresis or paralysis of the larynx or asymmetry in rotation of the larynx to the left or right. The patient was asked to breathe and the degree of abduction was noted bilaterally.  Subglottic view of the larynx was obtained for any additional mass lesions or mucosal changes.  Finally the post cricoid was examined for evidence of pooling of secretions, as well as the pharyngeal wall mucosa.   Anesthesia type: 0.25% phenylephrine    Findings:  Anatomic/physiological deviations:  RNC, no false vocal fold lesions   Right vocal process: No restriction of mobility   Left vocal process: No restriction of mobility  Glottal gap: Complete glottal closure  Supraglottic structures: Normal  Hypopharynx: Normal     Estimated Blood Loss: minimal  Complications: None  Disposition: Patient tolerated the procedure well                  Review of Relevant Clinical Data   Notes: Luis E Whalen 10/1/21  Radiology: CT neck 21           Labs:  No results found for: TSH  No results found for: NA, CO2, BUN, CREAT, GLUCOSE, PHOS  Lab Results   Component Value Date    WBC 5.1 2021    HGB 16.1 2021    HCT 46.2 2021    MCV 87 2021     2021     No results found for: PT, PTT, INR  No results found for: CORWIN  No components found for: RHEUMATOIDFACTOR,  RF  No results found for: CRP  No components found for: CKTOT, URICACID  No components found for: C3, C4, DSDNAAB, NDNAABIFA  No results found for: MPOAB    Patient reported Quality of Life (QOL) Measures   Patient Supplied Answers To VHI Questionnaire  No flowsheet data found.    Patient Supplied Answers To EAT Questionnaire  No flowsheet data found.    Patient Supplied Answers To CSI Questionnaire  No flowsheet data found.    Patient Supplied Answers to Dyspnea Index Questionnaire:  No flowsheet data found.    Impression & Plan     IMPRESSION: Mr. Thompson is a 41 year old male who is being seen for the followin. Neck mass  - started after right neck lymph node got large  - happened 2 months when sick with a cold  - had pain around it   - now gone but feels a pressure   - CT neck 21 with midline neck mass and right mixed laryngocele  - scope does not show false vocal fold swelling  - discussed he has both the cyst and the laryngocele  - discussed proceeding with a FNA for the cyst and then deciding on observation vs removal  Plan  - obtain ultrasound guided biopsy by interventional radiology  - will call after the  results, depending on biopsy results might need tumor board  - is changing insurance in January - will set up call with       2. Dysphagia  - feels clicking at times with swallowing  - food does not get stuck  - no pooling of saliva on scope   Plan  - observation      RETURN VISIT: after FNA    Thank you for the kind referral and for allowing me to share in the care of Mr. Thompson. If you have any questions, please do not hesitate to contact me.    Janet Kidd MD    Laryngology    [facility]  Department of  Otolaryngology - Head and Neck Surgery  Clinics & Surgery Center  [address] [place] [address]  Appointment line: [phone]  Fax: [phone]  [email or web address]    Scribe Disclosure:  I, Liya Castillo, am serving as a scribe to document services personally performed by Janet Kidd MD at this visit, based upon the provider's statements to me. All documentation has been reviewed by the aforementioned provider prior to being entered into the official medical record.

## 2021-11-23 NOTE — PROGRESS NOTES
Outpatient Neuroradiology Biopsy Referral    Patient is a 42 y/o male with a PMH of hernia repair, tonsillectomy due to frequent infections, swelling, new onset right enlarged lymph node onset 2 months ago after a cold, right neck mass. US 9/28/21 notes a right midline palpable complex cystic neck mass measuring 3.3 x 1.7 x 1.4 cm. F/U CT 9/29/21 notes a lobulated complex cystic mass measuring 2.5 x 1.4 x 2.6 S:4 I: 47 S 5 I: 21.  There is also a right sided laryngocele posterior to the  mass measuring 1.2 x .5 x 1.0.  CT read recommends to consider correlation with ultrasound-guided aspiration of the fluid component of this lesion for further characterization, to include cytology.  Neuroadioloogy has been asked to biopsy the thyroglossal cyst.    Case and imaging was reviewed with  from neuroradiology and US guided FNA of the right thyroglossal cystic mass is recommended.    Procedure order and surgical pathology order placed.    If requesting team would like sample sent for anything else please enter prior to scheduled procedure.     Primary team Dr. Kidd ENT made aware of IR recommendations via epic messaging.     CRISTINA Soto CNP  Interventional Radiology   IR on-call pager: [phone]

## 2021-11-26 DIAGNOSIS — Z11.59 ENCOUNTER FOR SCREENING FOR OTHER VIRAL DISEASES: ICD-10-CM

## 2021-12-09 ENCOUNTER — PRE VISIT (OUTPATIENT)
Dept: OTOLARYNGOLOGY | Facility: CLINIC | Age: 41
End: 2021-12-09

## 2021-12-30 ENCOUNTER — MYC MEDICAL ADVICE (OUTPATIENT)
Dept: INTERVENTIONAL RADIOLOGY/VASCULAR | Facility: CLINIC | Age: 41
End: 2021-12-30
Payer: COMMERCIAL

## 2022-01-02 ENCOUNTER — LAB (OUTPATIENT)
Dept: URGENT CARE | Facility: URGENT CARE | Age: 42
End: 2022-01-02
Payer: COMMERCIAL

## 2022-01-02 DIAGNOSIS — Z11.59 ENCOUNTER FOR SCREENING FOR OTHER VIRAL DISEASES: ICD-10-CM

## 2022-01-02 PROCEDURE — U0003 INFECTIOUS AGENT DETECTION BY NUCLEIC ACID (DNA OR RNA); SEVERE ACUTE RESPIRATORY SYNDROME CORONAVIRUS 2 (SARS-COV-2) (CORONAVIRUS DISEASE [COVID-19]), AMPLIFIED PROBE TECHNIQUE, MAKING USE OF HIGH THROUGHPUT TECHNOLOGIES AS DESCRIBED BY CMS-2020-01-R: HCPCS

## 2022-01-02 PROCEDURE — U0005 INFEC AGEN DETEC AMPLI PROBE: HCPCS

## 2022-01-03 LAB — SARS-COV-2 RNA RESP QL NAA+PROBE: NEGATIVE

## 2022-01-05 ENCOUNTER — TELEPHONE (OUTPATIENT)
Dept: NURSING | Facility: CLINIC | Age: 42
End: 2022-01-05
Payer: COMMERCIAL

## 2022-01-06 ENCOUNTER — APPOINTMENT (OUTPATIENT)
Dept: INTERVENTIONAL RADIOLOGY/VASCULAR | Facility: CLINIC | Age: 42
End: 2022-01-06
Attending: OTOLARYNGOLOGY
Payer: COMMERCIAL

## 2022-01-06 ENCOUNTER — HOSPITAL ENCOUNTER (OUTPATIENT)
Facility: CLINIC | Age: 42
Discharge: HOME | End: 2022-01-06
Attending: OTOLARYNGOLOGY | Admitting: OTOLARYNGOLOGY
Payer: COMMERCIAL

## 2022-01-06 ENCOUNTER — APPOINTMENT (OUTPATIENT)
Dept: MEDSURG UNIT | Facility: CLINIC | Age: 42
End: 2022-01-06
Attending: OTOLARYNGOLOGY
Payer: COMMERCIAL

## 2022-01-06 VITALS
BODY MASS INDEX: 21.7 KG/M2 | TEMPERATURE: 98.2 F | HEART RATE: 78 BPM | HEIGHT: 71 IN | RESPIRATION RATE: 16 BRPM | OXYGEN SATURATION: 96 % | SYSTOLIC BLOOD PRESSURE: 119 MMHG | WEIGHT: 155 LBS | DIASTOLIC BLOOD PRESSURE: 78 MMHG

## 2022-01-06 DIAGNOSIS — R22.1 LOCALIZED SWELLING, MASS AND LUMP, NECK: ICD-10-CM

## 2022-01-06 LAB
ERYTHROCYTE [DISTWIDTH] IN BLOOD BY AUTOMATED COUNT: 12.5 % (ref 10–15)
HCT VFR BLD AUTO: 45.4 % (ref 40–53)
HGB BLD-MCNC: 15.5 G/DL (ref 13.3–17.7)
INR PPP: 1.08 (ref 0.85–1.15)
MCH RBC QN AUTO: 30.1 PG (ref 26.5–33)
MCHC RBC AUTO-ENTMCNC: 34.1 G/DL (ref 31.5–36.5)
MCV RBC AUTO: 88 FL (ref 78–100)
PLATELET # BLD AUTO: 202 10E3/UL (ref 150–450)
RBC # BLD AUTO: 5.15 10E6/UL (ref 4.4–5.9)
WBC # BLD AUTO: 4.8 10E3/UL (ref 4–11)

## 2022-01-06 PROCEDURE — 999N000142 HC STATISTIC PROCEDURE PREP ONLY

## 2022-01-06 PROCEDURE — 10005 FNA BX W/US GDN 1ST LES: CPT

## 2022-01-06 PROCEDURE — 999N000132 HC STATISTIC PP CARE STAGE 1

## 2022-01-06 PROCEDURE — 88305 TISSUE EXAM BY PATHOLOGIST: CPT | Mod: 26 | Performed by: PATHOLOGY

## 2022-01-06 PROCEDURE — 85027 COMPLETE CBC AUTOMATED: CPT | Performed by: NURSE PRACTITIONER

## 2022-01-06 PROCEDURE — 85610 PROTHROMBIN TIME: CPT | Mod: GZ | Performed by: NURSE PRACTITIONER

## 2022-01-06 PROCEDURE — 88173 CYTOPATH EVAL FNA REPORT: CPT | Mod: 26 | Performed by: PATHOLOGY

## 2022-01-06 PROCEDURE — 36415 COLL VENOUS BLD VENIPUNCTURE: CPT | Performed by: NURSE PRACTITIONER

## 2022-01-06 PROCEDURE — 88172 CYTP DX EVAL FNA 1ST EA SITE: CPT | Mod: 26 | Performed by: PATHOLOGY

## 2022-01-06 PROCEDURE — 250N000009 HC RX 250: Performed by: STUDENT IN AN ORGANIZED HEALTH CARE EDUCATION/TRAINING PROGRAM

## 2022-01-06 PROCEDURE — 88305 TISSUE EXAM BY PATHOLOGIST: CPT | Mod: TC | Performed by: NURSE PRACTITIONER

## 2022-01-06 PROCEDURE — 10005 FNA BX W/US GDN 1ST LES: CPT | Mod: GC | Performed by: STUDENT IN AN ORGANIZED HEALTH CARE EDUCATION/TRAINING PROGRAM

## 2022-01-06 RX ORDER — SODIUM CHLORIDE 9 MG/ML
INJECTION, SOLUTION INTRAVENOUS CONTINUOUS
Status: DISCONTINUED | OUTPATIENT
Start: 2022-01-06 | End: 2022-01-11 | Stop reason: HOSPADM

## 2022-01-06 RX ORDER — LIDOCAINE HYDROCHLORIDE 10 MG/ML
1-30 INJECTION, SOLUTION EPIDURAL; INFILTRATION; INTRACAUDAL; PERINEURAL
Status: COMPLETED | OUTPATIENT
Start: 2022-01-06 | End: 2022-01-06

## 2022-01-06 RX ORDER — LIDOCAINE 40 MG/G
CREAM TOPICAL
Status: DISCONTINUED | OUTPATIENT
Start: 2022-01-06 | End: 2022-01-11 | Stop reason: HOSPADM

## 2022-01-06 RX ADMIN — LIDOCAINE HYDROCHLORIDE 6 ML: 10 INJECTION, SOLUTION EPIDURAL; INFILTRATION; INTRACAUDAL; PERINEURAL at 10:04

## 2022-01-06 ASSESSMENT — MIFFLIN-ST. JEOR: SCORE: 1635.21

## 2022-01-06 NOTE — DISCHARGE INSTRUCTIONS
[facility]    Interventional Radiology  Patient Instructions Following Biopsy    AFTER YOU GO HOME  ? DO relax and take it easy for 48 hours, no strenuous activity for 24 hours  ? DO drink plenty of fluids  ? DO resume your regular diet, unless otherwise instructed by your Primary Physician  ? Keep the dressing dry and in place for 24 hours.  ? DO NOT SMOKE FOR AT LEAST 24 HOURS, if you have been given any medications that were to help you relax or sedate you during your procedure  ? DO NOT drink alcoholic beverages the day of your procedure  ? DO NOT do any strenuous exercise or lifting (> 10 lbs) for at least 3 days following your procedure  ? DO NOT take a bath or shower for at least 12 hours following your procedure  ? Remove dressing after shower the next day. Replace with Band aid for 2 days.  Never leave a wet dressing in place.  ? There should be minimum drainage from the biopsy site    CALL THE PHYSICIAN IF:  ? You start bleeding from the procedure site.  If you do start to bleed from that site, lie down flat and hold pressure on the site for a minimum of 10 minutes.  Your physician will tell you if you need to return to the hospital  ? You develop nausea or vomiting  ? You have excessive swelling, redness, or tenderness at the site  ? You have drainage that looks like it is infected.  ? You experience severe pain  ? You develop hives or a rash or unexplained itching  ? You develop shortness of breath  ? You develop a temperature of 101 degrees F or greater        [facility] INTERVENTIONAL RADIOLOGY DEPARTMENT  Procedure Physician: Dr Alonzo                                    Date of procedure: January 6, 2022  Telephone Numbers: [phone] Monday-Friday 8:00 am to 4:30 pm  [phone] After 4:30 pm Monday-Friday, Weekends & Holidays.   Ask for the Interventional Radiologist on call.  Someone is on call 24 hrs/day  [facility] toll free number: [phone] Monday-Friday 8:00 am to 4:30  pm  [facility] Emergency Dept: [phone]

## 2022-01-06 NOTE — PROGRESS NOTES
Patient Name: Michael Thomspon  Medical Record Number: [id number]  Today's Date: 1/6/2022    Procedure: Ultrasound guided soft tissue biopsy of the right neck  Proceduralist: Dr. Alonzo and Dr. Martinez  Pathology present: Yes    Procedure Start: 0945  Procedure end: 1010  Sedation medications administered: None     Report given to: Shabana Link 2A  : KELSEY    Other Notes: Pt arrived to IR room 6 from . Consent reviewed. Pt denies any questions or concerns regarding procedure. Pt positioned supine and monitored per protocol. Pt tolerated procedure without any noted complications. Specimen collected with pathology present, sent to lab.  Pt transferred back to .

## 2022-01-06 NOTE — TELEPHONE ENCOUNTER
Patient calling to report that he has a biopsy scheduled for tomorrow morning, 01/06/2022. IR procedure for right neck lymph node biopsy.     Patient reports that he received a call last week and informed that he did not need a  since anesthesia is local.     Patient stating he received a general message from [email or web address] and that he needs a .  Patient unable to get a  and unable to get a Uber or any other form a transportation and will need to drive himself.     Paged on call for IR at 7:10PM and 7:38PM and ENT at 8:02PM    Received call back from Dr. Devin Lubin at 08:40, Dr. Lubin was unable to answer page right away due to an emergency.  Per Dr. Lubin able to speak with attending and patient will be under local anesthesia and can drive himself. RN updated patient.  No further questions.       Radha Varghese RN  01/05/22 8:53 PM  [facility] Nurse Advisor

## 2022-01-06 NOTE — PROGRESS NOTES
Pt back on 2A s/p FNA of soft tissue biopsy of the right neck.  VSS.  Pt A/O x4.  Pt denies any pain.  Rt neck incision F/D./I,.  No sedation given for th procedure.

## 2022-01-06 NOTE — PROCEDURES
[facility], [place]     Neuroradiology Procedure Note    Image Guided Biopsy     1/6/2022 10:32 AM    Performed by: Milan Martinez MD MD  Authorized by: MD Milan Reyes MD MD    Pre Procedure Diagnosis: Neck mass    Post Procedure Diagnosis: Same    Procedure: FNA  under US / CT Guidance     Consent given by: Patient who states understanding of the procedure being performed after discussing the risks, benefits and alternatives.    Time Out: Prior to the start of the procedure and with procedural staff participation, I verbally confirmed the patient s identity using two indicators, relevant allergies, that the procedure was appropriate and matched the consent or emergent situation, and that the correct equipment/implants were available. Immediately prior to starting the procedure I conducted the Time Out with the procedural staff and re-confirmed the patient s name, procedure, and site/side. (The Joint Commission universal protocol was followed.)      Yes    Procedure:    Under sterile conditions the patient was positioned lying supine. Under imaging guidance, needle entrance side was defined.  Betadine solution and sterile drapes were utilized.    3 ml of lidocaine 1% without epinephrine was administered at the biopsy entrance site.    Under imaging guidance two 25 gauge needles and one 21 gauge needle were advanced into the target lesion.  3 separate specimens were obtained. Pathology was on hand to confirm that the sampling was adequate for diagnosis. The biopsy needle was then removed and manual compression was applied to the skin entrance. The procedure was well tolerated. A repeat imaging of the area revealed no significant bleeding. No immediate complications were noted    Estimated Blood Loss: Minimal    SPECIMENS: Fine needle aspirate for cytological analysis    Complications: None    Condition: Stable Patient is transferred to Outpatient unit for post  procedure observation.    Plan: Wait for the pathology result    Comments: See dictated procedure note for full details     Milan Martinez MD 1/6/2022 10:34 AM

## 2022-01-06 NOTE — PROGRESS NOTES
Prep and teaching complete for Lymph Node Biopsy, R Neck;  pt awake and alert, denies pain. Drove self and ate breakfast; no sedation planned; MD aware.  Consent current; awaiting lab results.

## 2022-01-07 LAB
PATH REPORT.COMMENTS IMP SPEC: ABNORMAL
PATH REPORT.COMMENTS IMP SPEC: YES
PATH REPORT.FINAL DX SPEC: ABNORMAL
PATH REPORT.GROSS SPEC: ABNORMAL
PATH REPORT.RELEVANT HX SPEC: ABNORMAL

## 2022-01-11 ENCOUNTER — VIRTUAL VISIT (OUTPATIENT)
Dept: OTOLARYNGOLOGY | Facility: CLINIC | Age: 42
End: 2022-01-11
Payer: COMMERCIAL

## 2022-01-11 VITALS — HEIGHT: 71 IN | WEIGHT: 155 LBS | BODY MASS INDEX: 21.7 KG/M2

## 2022-01-11 DIAGNOSIS — Q31.3 LARYNGOCELE: ICD-10-CM

## 2022-01-11 DIAGNOSIS — Q89.2 THYROGLOSSAL DUCT CYST: Primary | ICD-10-CM

## 2022-01-11 PROCEDURE — 99207 PR NO CHARGE LOS: CPT | Performed by: OTOLARYNGOLOGY

## 2022-01-11 ASSESSMENT — MIFFLIN-ST. JEOR: SCORE: 1635.21

## 2022-01-11 ASSESSMENT — PAIN SCALES - GENERAL: PAINLEVEL: NO PAIN (0)

## 2022-01-11 NOTE — NURSING NOTE
"Chief Complaint   Patient presents with     RECHECK     Follow up       Height 1.803 m (5' 11\"), weight 70.3 kg (155 lb).    Bakari Dudley, EMT  "

## 2022-01-11 NOTE — LETTER
2022       RE: Michael Thompson  [address]  [place] [facility] [address]     Dear Colleague,    Thank you for referring your patient, Michael Thompson, to the [facility] EAR NOSE AND THROAT CLINIC [place] at [facility]. Please see a copy of my visit note below.    Michael is a 41 year old who is being evaluated via a billable telephone visit.      What phone number would you like to be contacted at? [phone]  How would you like to obtain your AVS? MyChart  Phone call duration: 13 minutes          [facility]   at the [facility]   Otolaryngology Clinic     Patient: Michael Thompson    MRN: [id number]    : 1980    Age/Gender: 41 year old male  Date of Service: 2022  Rendering Provider:   Janet Kidd MD     Chief Complaint   Neck mass  S/p FNA 2022  Interval History   HISTORY OF PRESENT ILLNESS: Mr. Thompson is a 41 year old male is being followed for neck mass. he was initially seen on 2021. Please refer to this note for full history.     Of note, the patient underwent tonsillectomy at age 23. This was done for recurrent infection and swelling. He underwent many rounds of antibiotics and were eventually unable to determine the etiology of this.       Today, he presents for follow up. he reports:  - pressure and clicking still present  - ridge that gets stuck on Fred's apple area  - the area stretches and pops  - thinks still sore from biopsy  - some discomfort present still   - would like to have the mass removed     PAST MEDICAL HISTORY: No past medical history on file.    PAST SURGICAL HISTORY:   Past Surgical History:   Procedure Laterality Date     HERNIA REPAIR       IR FINE NEEDLE ASPIRATION W ULTRASOUND  2022     lump removal      right upper back     TONSILLECTOMY, ADENOIDECTOMY, COMBINED  2004       CURRENT MEDICATIONS: No current outpatient medications on file.    ALLERGIES: Patient has no  known allergies.    SOCIAL HISTORY:    Social History     Socioeconomic History     Marital status: Single     Spouse name: Not on file     Number of children: Not on file     Years of education: Not on file     Highest education level: Not on file   Occupational History     Not on file   Tobacco Use     Smoking status: Never Smoker     Smokeless tobacco: Never Used   Vaping Use     Vaping Use: Never used   Substance and Sexual Activity     Alcohol use: Yes     Comment: social     Drug use: Never     Sexual activity: Yes     Partners: Female     Birth control/protection: None   Other Topics Concern     Parent/sibling w/ CABG, MI or angioplasty before 65F 55M? Not Asked   Social History Narrative     Not on file     Social Determinants of Health     Financial Resource Strain: Not on file   Food Insecurity: Not on file   Transportation Needs: Not on file   Physical Activity: Not on file   Stress: Not on file   Social Connections: Not on file   Intimate Partner Violence: Not on file   Housing Stability: Not on file         FAMILY HISTORY:   Family History   Problem Relation Age of Onset     Anxiety Disorder Mother      Prostate Cancer Maternal Grandfather      Cancer Paternal Grandfather       Non-contributory for problems with anesthesia    REVIEW OF SYSTEMS:   The patient was asked a 14 point review of systems regarding constitutional symptoms, eye symptoms, ears, nose, mouth, throat symptoms, cardiovascular symptoms, respiratory symptoms, gastrointestinal symptoms, genitourinary symptoms, musculoskeletal symptoms, integumentary symptoms, neurological symptoms, psychiatric symptoms, endocrine symptoms, hematologic/lymphatic symptoms, and allergic/ immunologic symptoms.   The pertinent factors have been included in the HPI and below.  Patient Supplied Answers to Review of Systems  No flowsheet data found.     Physical Examination   The patient underwent a physical examination as described below. The pertinent positive  and negative findings are summarized after the description of the examination.  Neurologic: The patient's orientation, mood, and affect were noted.   Other pertinent positive and negative findings on physical examination:   Breathing comfortably on room air, no stridor with deep inspiration    All other physical examination findings were within normal limits and noncontributory      Review of Relevant Clinical Data   I personally reviewed:  Pathology: Fine needle aspiration 2022   Interpretation:   Cystic squamous lesion (see comment)  Adequacy:  Satisfactory for evaluation  Labs:  No results found for: TSH  No results found for: NA, CO2, BUN, CREAT, GLUCOSE, PHOS  Lab Results   Component Value Date    WBC 4.8 2022    HGB 15.5 2022    HCT 45.4 2022    MCV 88 2022     2022     Lab Results   Component Value Date    INR 1.08 2022     No results found for: CORWIN  No components found for: RHEUMATOIDFACTOR,  RF  No results found for: CRP  No components found for: CKTOT, URICACID  No components found for: C3, C4, DSDNAAB, NDNAABIFA  No results found for: MPOAB    Patient reported Quality of Life (QOL) Measures   Patient Supplied Answers To VHI Questionnaire  No flowsheet data found.      Patient Supplied Answers To EAT Questionnaire  No flowsheet data found.      Patient Supplied Answers To CSI Questionnaire  No flowsheet data found.      Patient Supplied Answers to Dyspnea Index Questionnaire:  No flowsheet data found.    Impression & Plan     IMPRESSION: Mr. Thompson is a 41 year old male who is being seen for the followin. 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 Dysphagia  - feels clicking at times with swallowing  - food does not get stuck  - no pooling of saliva on scope   - discussed symptoms could improve after surgery but not guaranteed   Plan  - observation    RETURN VISIT: after surgery         Scribe Disclosure:  I, Luz Camacho am serving as a scribe to document services personally performed by Janet Kidd MD at this visit, based upon the provider's statements to me. All documentation has been reviewed by the aforementioned provider prior to being entered into the official medical record.       Janet Kidd MD    Laryngology    [facility]  Department of  Otolaryngology - Head and Neck Surgery  Clinics & Surgery Center  [address] [place] [address]  Appointment line: [phone]  Fax: [phone]  [email or web address]             Again, thank you for allowing me to participate in the care of your patient.      Sincerely,    Janet Kidd MD

## 2022-01-11 NOTE — PATIENT INSTRUCTIONS
1.  You were seen in the ENT Clinic today by . If you have any questions or concerns after your appointment, please call [phone]. Press option #1 for scheduling related needs. Press option #3 for Nurse advice.    2.   has recommended  the following:   - surgery    3.  Plan is to return to clinic for post operative follow up visit      Ramona Sharpe LPN  [phone]  [facility] - Otolaryngology

## 2022-01-11 NOTE — PROGRESS NOTES
Michael is a 41 year old who is being evaluated via a billable telephone visit.      What phone number would you like to be contacted at? [phone]  How would you like to obtain your AVS? Aniya  Phone call duration: 13 minutes          [facility]   at the [facility]   Otolaryngology Clinic     Patient: Michael Thompson    MRN: [id number]    : 1980    Age/Gender: 41 year old male  Date of Service: 2022  Rendering Provider:   Janet Kidd MD     Chief Complaint   Neck mass  S/p FNA 2022  Interval History   HISTORY OF PRESENT ILLNESS: Mr. Thompson is a 41 year old male is being followed for neck mass. he was initially seen on 2021. Please refer to this note for full history.     Of note, the patient underwent tonsillectomy at age 23. This was done for recurrent infection and swelling. He underwent many rounds of antibiotics and were eventually unable to determine the etiology of this.       Today, he presents for follow up. he reports:  - pressure and clicking still present  - ridge that gets stuck on Fred's apple area  - the area stretches and pops  - thinks still sore from biopsy  - some discomfort present still   - would like to have the mass removed     PAST MEDICAL HISTORY: No past medical history on file.    PAST SURGICAL HISTORY:   Past Surgical History:   Procedure Laterality Date     HERNIA REPAIR       IR FINE NEEDLE ASPIRATION W ULTRASOUND  2022     lump removal      right upper back     TONSILLECTOMY, ADENOIDECTOMY, COMBINED         CURRENT MEDICATIONS: No current outpatient medications on file.    ALLERGIES: Patient has no known allergies.    SOCIAL HISTORY:    Social History     Socioeconomic History     Marital status: Single     Spouse name: Not on file     Number of children: Not on file     Years of education: Not on file     Highest education level: Not on file   Occupational History     Not on file   Tobacco Use     Smoking status: Never  Smoker     Smokeless tobacco: Never Used   Vaping Use     Vaping Use: Never used   Substance and Sexual Activity     Alcohol use: Yes     Comment: social     Drug use: Never     Sexual activity: Yes     Partners: Female     Birth control/protection: None   Other Topics Concern     Parent/sibling w/ CABG, MI or angioplasty before 65F 55M? Not Asked   Social History Narrative     Not on file     Social Determinants of Health     Financial Resource Strain: Not on file   Food Insecurity: Not on file   Transportation Needs: Not on file   Physical Activity: Not on file   Stress: Not on file   Social Connections: Not on file   Intimate Partner Violence: Not on file   Housing Stability: Not on file         FAMILY HISTORY:   Family History   Problem Relation Age of Onset     Anxiety Disorder Mother      Prostate Cancer Maternal Grandfather      Cancer Paternal Grandfather       Non-contributory for problems with anesthesia    REVIEW OF SYSTEMS:   The patient was asked a 14 point review of systems regarding constitutional symptoms, eye symptoms, ears, nose, mouth, throat symptoms, cardiovascular symptoms, respiratory symptoms, gastrointestinal symptoms, genitourinary symptoms, musculoskeletal symptoms, integumentary symptoms, neurological symptoms, psychiatric symptoms, endocrine symptoms, hematologic/lymphatic symptoms, and allergic/ immunologic symptoms.   The pertinent factors have been included in the HPI and below.  Patient Supplied Answers to Review of Systems  No flowsheet data found.     Physical Examination   The patient underwent a physical examination as described below. The pertinent positive and negative findings are summarized after the description of the examination.  Neurologic: The patient's orientation, mood, and affect were noted.   Other pertinent positive and negative findings on physical examination:   Breathing comfortably on room air, no stridor with deep inspiration    All other physical examination  findings were within normal limits and noncontributory      Review of Relevant Clinical Data   I personally reviewed:  Pathology: Fine needle aspiration 2022   Interpretation:   Cystic squamous lesion (see comment)  Adequacy:  Satisfactory for evaluation  Labs:  No results found for: TSH  No results found for: NA, CO2, BUN, CREAT, GLUCOSE, PHOS  Lab Results   Component Value Date    WBC 4.8 2022    HGB 15.5 2022    HCT 45.4 2022    MCV 88 2022     2022     Lab Results   Component Value Date    INR 1.08 2022     No results found for: CORWIN  No components found for: RHEUMATOIDFACTOR,  RF  No results found for: CRP  No components found for: CKTOT, URICACID  No components found for: C3, C4, DSDNAAB, NDNAABIFA  No results found for: MPOAB    Patient reported Quality of Life (QOL) Measures   Patient Supplied Answers To VHI Questionnaire  No flowsheet data found.      Patient Supplied Answers To EAT Questionnaire  No flowsheet data found.      Patient Supplied Answers To CSI Questionnaire  No flowsheet data found.      Patient Supplied Answers to Dyspnea Index Questionnaire:  No flowsheet data found.    Impression & Plan     IMPRESSION: Mr. Thompson is a 41 year old male who is being seen for the followin. 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 Dysphagia  - feels clicking at times with swallowing  - food does not get stuck  - no pooling of saliva on scope   - discussed symptoms could improve after surgery but not guaranteed   Plan  - observation    RETURN VISIT: after surgery         Scribe Disclosure:  I, Luz Camacho am serving as a scribe to document services personally performed by Janet Kidd MD at this visit, based upon the provider's statements to me. All documentation has been reviewed by the aforementioned provider prior to being entered into the official medical record.       Janet Kidd MD    Laryngology    [facility]  Department of  Otolaryngology - Head and Neck Surgery  [place] [address]  Appointment line: [phone]  Fax: [phone]  [email or web address]

## 2022-01-11 NOTE — LETTER
Date:January 12, 2022      Patient was self referred, no letter generated. Do not send.        [facility] Health Information

## 2022-02-02 ENCOUNTER — PREP FOR PROCEDURE (OUTPATIENT)
Dept: OTOLARYNGOLOGY | Facility: CLINIC | Age: 42
End: 2022-02-02
Payer: COMMERCIAL

## 2022-02-02 DIAGNOSIS — Q89.2 THYROGLOSSAL DUCT CYST: ICD-10-CM

## 2022-02-02 DIAGNOSIS — Q31.3 LARYNGOCELE: Primary | ICD-10-CM

## 2022-02-02 RX ORDER — CEFAZOLIN SODIUM 2 G/50ML
2 SOLUTION INTRAVENOUS SEE ADMIN INSTRUCTIONS
Status: CANCELLED | OUTPATIENT
Start: 2022-02-02

## 2022-02-02 RX ORDER — CEFAZOLIN SODIUM 2 G/50ML
2 SOLUTION INTRAVENOUS
Status: CANCELLED | OUTPATIENT
Start: 2022-02-02

## 2022-02-04 ENCOUNTER — TELEPHONE (OUTPATIENT)
Dept: OTOLARYNGOLOGY | Facility: CLINIC | Age: 42
End: 2022-02-04
Payer: COMMERCIAL

## 2022-02-04 NOTE — TELEPHONE ENCOUNTER
Called patient to schedule surgery. Pt states he's been waiting for this phone call.     Date of Surgery: 03/04/2022 or 4/01/2022 were offered to patient, 03/04/2022, patient has decided. Wants to book 4/1 and move up if possible.    Approximate arrival time given:  No  Location of surgery: [place] OR - knows 2-3 days in hospital following surgery     Pre-Op H&P:   PCP - Patient was instructed within 30 days.     Post-Op Appt Date:  1-2 weeks  Imaging needed:  No  Scheduled/Discussed COVID-19 Testing: Yes - discussed    Authorization Completed (Before Scheduling)  No    Patient aware that pre-op RN will call 2-3 days prior to surgery with arrival time and instructions Yes  Packet sent out: Yes 02/04/22      Additional Comments:   Aware that we are on the waitlist for 3/4/2022.     Will have MARIA G Castelan reach out regarding surgery teaching.     All patients questions were answered and was instructed to review surgical packet and call back with any questions or concerns.       Scarlet Mitchell on 2/4/2022 at 3:38 PM

## 2022-02-08 NOTE — TELEPHONE ENCOUNTER
Patient called back regarding surgery with Dr. Kidd. Informed patient writer was able to confirm OR time on 3/4/2022. He has been scheduled for this and pre-op RN will call 2-3 days prior to surgery.     Patient will arrange PCP pre-op. Pt indicated and inquired regarding pre-op covid-19 test if it came back positive and the guidelines. Informed him that if positive it would depend on factors. Would not proceed with surgery due to non-urgent/life threatening. 14 days or 6 weeks based on symptoms. Patient wanted to know if he would need another pre-op. Informed patient yes if that was the case, and patient inquired if he would have to be billed two times for the pre-op. Informed patient that we don't have any control over that in the clinic and writer is unsure.     Informed patient that we will be hopeful that no positive test. Denies that he has tested positive within 90 days of surgery.     Scarlet Mitchell on 2/8/2022 at 12:29 PM

## 2022-02-09 DIAGNOSIS — Z11.59 ENCOUNTER FOR SCREENING FOR OTHER VIRAL DISEASES: Primary | ICD-10-CM

## 2022-02-10 ENCOUNTER — PATIENT OUTREACH (OUTPATIENT)
Dept: OTOLARYNGOLOGY | Facility: CLINIC | Age: 42
End: 2022-02-10
Payer: COMMERCIAL

## 2022-02-10 NOTE — PROGRESS NOTES
Reached out to patient to go over surgical teaching and any questions related to the procedure with Dr. Kidd. Left voicemail and provided direct call back number.    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

## 2022-02-17 ENCOUNTER — OFFICE VISIT (OUTPATIENT)
Dept: FAMILY MEDICINE | Facility: CLINIC | Age: 42
End: 2022-02-17
Payer: COMMERCIAL

## 2022-02-17 VITALS
DIASTOLIC BLOOD PRESSURE: 76 MMHG | HEART RATE: 95 BPM | SYSTOLIC BLOOD PRESSURE: 130 MMHG | BODY MASS INDEX: 20.75 KG/M2 | TEMPERATURE: 96.8 F | WEIGHT: 153.2 LBS | OXYGEN SATURATION: 96 % | HEIGHT: 72 IN

## 2022-02-17 DIAGNOSIS — Q89.2 THYROGLOSSAL DUCT CYST: Chronic | ICD-10-CM

## 2022-02-17 DIAGNOSIS — Q31.3 LARYNGOCELE: Chronic | ICD-10-CM

## 2022-02-17 DIAGNOSIS — Z01.818 PREOP GENERAL PHYSICAL EXAM: Primary | ICD-10-CM

## 2022-02-17 PROBLEM — Z87.19 HISTORY OF MELENA: Status: ACTIVE | Noted: 2019-05-14

## 2022-02-17 PROBLEM — K82.4 GALLBLADDER POLYP: Status: ACTIVE | Noted: 2019-05-17

## 2022-02-17 PROBLEM — R10.11 ABDOMINAL PAIN, RIGHT UPPER QUADRANT: Status: ACTIVE | Noted: 2019-05-14

## 2022-02-17 LAB
CREAT SERPL-MCNC: 0.84 MG/DL (ref 0.66–1.25)
GFR SERPL CREATININE-BSD FRML MDRD: >90 ML/MIN/1.73M2
HGB BLD-MCNC: 16 G/DL (ref 13.3–17.7)
POTASSIUM BLD-SCNC: 4.2 MMOL/L (ref 3.4–5.3)

## 2022-02-17 PROCEDURE — 85018 HEMOGLOBIN: CPT | Performed by: NURSE PRACTITIONER

## 2022-02-17 PROCEDURE — 99214 OFFICE O/P EST MOD 30 MIN: CPT | Performed by: NURSE PRACTITIONER

## 2022-02-17 PROCEDURE — 82565 ASSAY OF CREATININE: CPT | Performed by: NURSE PRACTITIONER

## 2022-02-17 PROCEDURE — 36415 COLL VENOUS BLD VENIPUNCTURE: CPT | Performed by: NURSE PRACTITIONER

## 2022-02-17 PROCEDURE — 84132 ASSAY OF SERUM POTASSIUM: CPT | Performed by: NURSE PRACTITIONER

## 2022-02-17 ASSESSMENT — PAIN SCALES - GENERAL: PAINLEVEL: NO PAIN (0)

## 2022-02-17 NOTE — PROGRESS NOTES
[facility]  [address] [facility] [address]  Phone: [phone]  Primary Provider: No Ref-Primary, Physician  Pre-op Performing Provider: CANDIDA FOLEY      PREOPERATIVE EVALUATION:  Today's date: 2/17/2022    Michael Thompson is a 41 year old male who presents for a preoperative evaluation.    Surgical Information:  Surgery/Procedure: neck exploration, excision of thyroglossal duct cyst, excision of right laryngocele, possible microlaryngoscopy and bronchoscopy  Surgery Location: [facility]  Surgeon: Janet Kidd  Surgery Date: 3/4/2022  Time of Surgery: TBD  Where patient plans to recover: At home with family  Fax number for surgical facility: Note does not need to be faxed, will be available electronically in Epic.    Type of Anesthesia Anticipated: General    Assessment & Plan     The proposed surgical procedure is considered INTERMEDIATE risk.    Preop general physical exam  Patient is cleared for surgery  - Potassium  - Creatinine  - Hemoglobin    Thyroglossal duct cyst    Laryngocele         Risks and Recommendations:  The patient has the following additional risks and recommendations for perioperative complications:   - No identified additional risk factors other than previously addressed    Medication Instructions:  Patient is on no chronic medications    RECOMMENDATION:  APPROVAL GIVEN to proceed with proposed procedure, without further diagnostic evaluation.      Subjective     HPI related to upcoming procedure: Michael Thompson is a 42 yo male who presents for a preoperative physical for planned neck exploration, excision of thyroglossal duct cyst, excision of right laryngocele, possible microlaryngoscopy and bronchoscopy.  He has been dealing with a neck mass and had FNA on 1/6/22.     Preop Questions 2/16/2022   1. Have you ever had a heart attack or stroke? No   2. Have you ever had surgery on your heart or blood vessels, such as a  stent placement, a coronary artery bypass, or surgery on an artery in your head, neck, heart, or legs? No   3. Do you have chest pain with activity? No   4. Do you have a history of  heart failure? No   5. Do you currently have a cold, bronchitis or symptoms of other infection? No   6. Do you have a cough, shortness of breath, or wheezing? No   7. Do you or anyone in your family have previous history of blood clots? No   8. Do you or does anyone in your family have a serious bleeding problem such as prolonged bleeding following surgeries or cuts? No   9. Have you ever had problems with anemia or been told to take iron pills? No   10. Have you had any abnormal blood loss such as black, tarry or bloody stools? YES - hx of melena in the past, not a current issue   11. Have you ever had a blood transfusion? No   12. Are you willing to have a blood transfusion if it is medically needed before, during, or after your surgery? Yes   13. Have you or any of your relatives ever had problems with anesthesia? No   14. Do you have sleep apnea, excessive snoring or daytime drowsiness? No   15. Do you have any artifical heart valves or other implanted medical devices like a pacemaker, defibrillator, or continuous glucose monitor? No   16. Do you have artificial joints? No   17. Are you allergic to latex? No       Health Care Directive:  Patient does not have a Health Care Directive or Living Will: No    Preoperative Review of :   reviewed - no record of controlled substances prescribed.      Status of Chronic Conditions:  See problem list for active medical problems.  Problems all longstanding and stable, except as noted/documented.  See ROS for pertinent symptoms related to these conditions.      Review of Systems  CONSTITUTIONAL: NEGATIVE for fever, chills, change in weight  INTEGUMENTARY/SKIN: NEGATIVE for worrisome rashes, moles or lesions  EYES: NEGATIVE for vision changes or irritation  ENT/MOUTH: NEGATIVE for ear,  "mouth and throat problems  RESP: NEGATIVE for significant cough or SOB  CV: NEGATIVE for chest pain, palpitations or peripheral edema  GI: NEGATIVE for nausea, abdominal pain, heartburn, or change in bowel habits  : NEGATIVE for frequency, dysuria, or hematuria  MUSCULOSKELETAL: NEGATIVE for significant arthralgias or myalgia  NEURO: NEGATIVE for weakness, dizziness or paresthesias  ENDOCRINE: NEGATIVE for temperature intolerance, skin/hair changes  HEME: NEGATIVE for bleeding problems  PSYCHIATRIC: NEGATIVE for changes in mood or affect    Patient Active Problem List    Diagnosis Date Noted     Thyroglossal duct cyst 02/17/2022     Priority: Medium     Laryngocele 02/17/2022     Priority: Medium     Gallbladder polyp 05/17/2019     Priority: Medium     Formatting of this note might be different from the original.  Several small polyps per U/S.  No evidence for cholecystitis or cholelithiasis.       History of melena 05/14/2019     Priority: Medium     Abdominal pain, right upper quadrant 05/14/2019     Priority: Medium     HSV-1 (herpes simplex virus 1) infection 10/15/2014     Priority: Medium      History reviewed. No pertinent past medical history.  Past Surgical History:   Procedure Laterality Date     HERNIA REPAIR       IR FINE NEEDLE ASPIRATION W ULTRASOUND  1/6/2022     lump removal  2013    right upper back     TONSILLECTOMY, ADENOIDECTOMY, COMBINED  2004     No current outpatient medications on file.       No Known Allergies     Social History     Tobacco Use     Smoking status: Never Smoker     Smokeless tobacco: Never Used   Substance Use Topics     Alcohol use: Yes     Comment: social       History   Drug Use Unknown         Objective     /76   Pulse 95   Temp 96.8  F (36  C) (Tympanic)   Ht 1.817 m (5' 11.54\")   Wt 69.5 kg (153 lb 3.2 oz)   SpO2 96%   BMI 21.05 kg/m      Physical Exam    GENERAL APPEARANCE: healthy, alert and no distress     EYES: EOMI,  PERRL     HENT: ear canals and " TM's normal and nose and mouth without ulcers or lesions     NECK: no adenopathy, no asymmetry, masses, or scars and thyroid normal to palpation     RESP: lungs clear to auscultation - no rales, rhonchi or wheezes     CV: regular rates and rhythm, normal S1 S2, no S3 or S4 and no murmur, click or rub     ABDOMEN:  soft, nontender, no HSM or masses and bowel sounds normal     MS: extremities normal- no gross deformities noted, no evidence of inflammation in joints, FROM in all extremities.     SKIN: no suspicious lesions or rashes     NEURO: Normal strength and tone, sensory exam grossly normal, mentation intact and speech normal     PSYCH: mentation appears normal. and affect normal/bright     LYMPHATICS: No cervical adenopathy    Recent Labs   Lab Test 01/06/22  0901 09/24/21  1048   HGB 15.5 16.1    187   INR 1.08  --       Diagnostics:    Hemoglobin   Date Value Ref Range Status   02/17/2022 16.0 13.3 - 17.7 g/dL Final     Creatinine   Date Value Ref Range Status   02/17/2022 0.84 0.66 - 1.25 mg/dL Final     Potassium   Date Value Ref Range Status   02/17/2022 4.2 3.4 - 5.3 mmol/L Final        No EKG required, no history of coronary heart disease, significant arrhythmia, peripheral arterial disease or other structural heart disease.    Revised Cardiac Risk Index (RCRI):  The patient has the following serious cardiovascular risks for perioperative complications:   - No serious cardiac risks = 0 points     RCRI Interpretation: 0 points: Class I (very low risk - 0.4% complication rate)           Signed Electronically by: Giselle Mendoza CNP  Copy of this evaluation report is provided to requesting physician.

## 2022-02-17 NOTE — PATIENT INSTRUCTIONS
No ibuprofen 5 days prior to surgery.  Tylenol is okay.   Preparing for Your Surgery  Getting started  A nurse will call you to review your health history and instructions. They will give you an arrival time based on your scheduled surgery time. Please be ready to share:    Your doctor's clinic name and phone number    Your medical, surgical and anesthesia history    A list of allergies and sensitivities    A list of medicines, including herbal treatments and over-the-counter drugs    Whether the patient has a legal guardian (ask how to send us the papers in advance)  Please tell us if you're pregnant--or if there's any chance you might be pregnant. Some surgeries may injure a fetus (unborn baby), so they require a pregnancy test. Surgeries that are safe for a fetus don't always need a test, and you can choose whether to have one.   If you have a child who's having surgery, please ask for a copy of Preparing for Your Child's Surgery.    Preparing for surgery    Within 30 days of surgery: Have a pre-op exam (sometimes called an H&P, or History and Physical). This can be done at a clinic or pre-operative center.  ? If you're having a , you may not need this exam. Talk to your care team.    At your pre-op exam, talk to your care team about all medicines you take. If you need to stop any medicines before surgery, ask when to start taking them again.  ? We do this for your safety. Many medicines can make you bleed too much during surgery. Some change how well surgery (anesthesia) drugs work.    Call your insurance company to let them know you're having surgery. (If you don't have insurance, call [phone].)    Call your clinic if there's any change in your health. This includes signs of a cold or flu (sore throat, runny nose, cough, rash, fever). It also includes a scrape or scratch near the surgery site.    If you have questions on the day of surgery, call your hospital or surgery center.  COVID testing  You  may need to be tested for COVID-19 before having surgery. If so, your surgical team will give you instructions for scheduling this test, separate from your preoperative history and physical.  Eating and drinking guidelines  For your safety: Unless your surgeon tells you otherwise, follow the guidelines below.    Eat and drink as usual until 8 hours before surgery. After that, no food or milk.    Drink clear liquids until 2 hours before surgery. These are liquids you can see through, like water, Gatorade and Propel Water. You may also have black coffee and tea (no cream or milk).    Nothing by mouth within 2 hours of surgery. This includes gum, candy and breath mints.    If you drink alcohol: Stop drinking it the night before surgery.    If your care team tells you to take medicine on the morning of surgery, it's okay to take it with a sip of water.  Preventing infection    Shower or bathe the night before and morning of your surgery. Follow the instructions your clinic gave you. (If no instructions, use regular soap.)    Don't shave or clip hair near your surgery site. We'll remove the hair if needed.    Don't smoke or vape the morning of surgery. You may chew nicotine gum up to 2 hours before surgery. A nicotine patch is okay.  ? Note: Some surgeries require you to completely quit smoking and nicotine. Check with your surgeon.    Your care team will make every effort to keep you safe from infection. We will:  ? Clean our hands often with soap and water (or an alcohol-based hand rub).  ? Clean the skin at your surgery site with a special soap that kills germs.  ? Give you a special gown to keep you warm. (Cold raises the risk of infection.)  ? Wear special hair covers, masks, gowns and gloves during surgery.  ? Give antibiotic medicine, if prescribed. Not all surgeries need antibiotics.  What to bring on the day of surgery    Photo ID and insurance card    Copy of your health care directive, if you have  one    Glasses and hearing aides (bring cases)  ? You can't wear contacts during surgery    Inhaler and eye drops, if you use them (tell us about these when you arrive)    CPAP machine or breathing device, if you use them    A few personal items, if spending the night    If you have . . .  ? A pacemaker, ICD (cardiac defibrillator) or other implant: Bring the ID card.  ? An implanted stimulator: Bring the remote control.  ? A legal guardian: Bring a copy of the certified (court-stamped) guardianship papers.  Please remove any jewelry, including body piercings. Leave jewelry and other valuables at home.  If you're going home the day of surgery    You must have a responsible adult drive you home. They should stay with you overnight as well.    If you don't have someone to stay with you, and you aren't safe to go home alone, we may keep you overnight. Insurance often won't pay for this.  After surgery  If it's hard to control your pain or you need more pain medicine, please call your surgeon's office.  Questions?   If you have any questions for your care team, list them here: _________________________________________________________________________________________________________________________________________________________________________ ____________________________________ ____________________________________ ____________________________________  For informational purposes only. Not to replace the advice of your health care provider. Copyright   2003, 2019 [employer]. All rights reserved. Clinically reviewed by Brandi Tsang MD. [email or web address] [id number] - REV 07/21.

## 2022-02-18 NOTE — PROGRESS NOTES
Reached out to patient and discussed what to expect regarding procedure and the in patient stay. Patient also has questions regarding driving once he's discharged from the hospital. Writer will confirm and return patient call if there are issues with the current plan regarding driving himself home. Encouraged to get a ride if able but will inquire regarding driving self home. Patient has no further questions at this time.    Annelise Nails RN on 2/18/2022 at 3:46 PM

## 2022-02-28 ENCOUNTER — LAB (OUTPATIENT)
Dept: LAB | Facility: CLINIC | Age: 42
End: 2022-02-28
Payer: COMMERCIAL

## 2022-02-28 DIAGNOSIS — Z11.59 ENCOUNTER FOR SCREENING FOR OTHER VIRAL DISEASES: ICD-10-CM

## 2022-02-28 PROCEDURE — U0005 INFEC AGEN DETEC AMPLI PROBE: HCPCS

## 2022-02-28 PROCEDURE — U0003 INFECTIOUS AGENT DETECTION BY NUCLEIC ACID (DNA OR RNA); SEVERE ACUTE RESPIRATORY SYNDROME CORONAVIRUS 2 (SARS-COV-2) (CORONAVIRUS DISEASE [COVID-19]), AMPLIFIED PROBE TECHNIQUE, MAKING USE OF HIGH THROUGHPUT TECHNOLOGIES AS DESCRIBED BY CMS-2020-01-R: HCPCS

## 2022-03-01 LAB — SARS-COV-2 RNA RESP QL NAA+PROBE: NEGATIVE

## 2022-03-03 ENCOUNTER — ANESTHESIA EVENT (OUTPATIENT)
Dept: SURGERY | Facility: CLINIC | Age: 42
DRG: 983 | End: 2022-03-03
Payer: COMMERCIAL

## 2022-03-04 ENCOUNTER — ANESTHESIA (OUTPATIENT)
Dept: SURGERY | Facility: CLINIC | Age: 42
DRG: 983 | End: 2022-03-04
Payer: COMMERCIAL

## 2022-03-04 ENCOUNTER — HOSPITAL ENCOUNTER (INPATIENT)
Facility: CLINIC | Age: 42
LOS: 1 days | Discharge: HOME | DRG: 983 | End: 2022-03-05
Attending: OTOLARYNGOLOGY | Admitting: OTOLARYNGOLOGY
Payer: COMMERCIAL

## 2022-03-04 DIAGNOSIS — Q89.2 THYROGLOSSAL DUCT CYST: Primary | Chronic | ICD-10-CM

## 2022-03-04 LAB — GLUCOSE BLDC GLUCOMTR-MCNC: 104 MG/DL (ref 70–99)

## 2022-03-04 PROCEDURE — 250N000009 HC RX 250: Performed by: ANESTHESIOLOGY

## 2022-03-04 PROCEDURE — 250N000009 HC RX 250: Performed by: OTOLARYNGOLOGY

## 2022-03-04 PROCEDURE — 258N000003 HC RX IP 258 OP 636: Performed by: OTOLARYNGOLOGY

## 2022-03-04 PROCEDURE — 88305 TISSUE EXAM BY PATHOLOGIST: CPT | Mod: 26 | Performed by: PATHOLOGY

## 2022-03-04 PROCEDURE — 250N000011 HC RX IP 250 OP 636: Performed by: ANESTHESIOLOGY

## 2022-03-04 PROCEDURE — 250N000011 HC RX IP 250 OP 636: Performed by: STUDENT IN AN ORGANIZED HEALTH CARE EDUCATION/TRAINING PROGRAM

## 2022-03-04 PROCEDURE — 82962 GLUCOSE BLOOD TEST: CPT

## 2022-03-04 PROCEDURE — 370N000017 HC ANESTHESIA TECHNICAL FEE, PER MIN: Performed by: OTOLARYNGOLOGY

## 2022-03-04 PROCEDURE — 120N000002 HC R&B MED SURG/OB UMMC

## 2022-03-04 PROCEDURE — 250N000011 HC RX IP 250 OP 636: Performed by: OTOLARYNGOLOGY

## 2022-03-04 PROCEDURE — 250N000013 HC RX MED GY IP 250 OP 250 PS 637: Performed by: STUDENT IN AN ORGANIZED HEALTH CARE EDUCATION/TRAINING PROGRAM

## 2022-03-04 PROCEDURE — 250N000025 HC SEVOFLURANE, PER MIN: Performed by: OTOLARYNGOLOGY

## 2022-03-04 PROCEDURE — 0NBX0ZZ EXCISION OF HYOID BONE, OPEN APPROACH: ICD-10-PCS | Performed by: OTOLARYNGOLOGY

## 2022-03-04 PROCEDURE — 360N000076 HC SURGERY LEVEL 3, PER MIN: Performed by: OTOLARYNGOLOGY

## 2022-03-04 PROCEDURE — 999N000141 HC STATISTIC PRE-PROCEDURE NURSING ASSESSMENT: Performed by: OTOLARYNGOLOGY

## 2022-03-04 PROCEDURE — 60280 REMOVE THYROID DUCT LESION: CPT | Mod: GC | Performed by: OTOLARYNGOLOGY

## 2022-03-04 PROCEDURE — 0CJS8ZZ INSPECTION OF LARYNX, VIA NATURAL OR ARTIFICIAL OPENING ENDOSCOPIC: ICD-10-PCS | Performed by: OTOLARYNGOLOGY

## 2022-03-04 PROCEDURE — 88311 DECALCIFY TISSUE: CPT | Mod: 26 | Performed by: PATHOLOGY

## 2022-03-04 PROCEDURE — 710N000010 HC RECOVERY PHASE 1, LEVEL 2, PER MIN: Performed by: OTOLARYNGOLOGY

## 2022-03-04 PROCEDURE — 258N000003 HC RX IP 258 OP 636: Performed by: ANESTHESIOLOGY

## 2022-03-04 PROCEDURE — 272N000001 HC OR GENERAL SUPPLY STERILE: Performed by: OTOLARYNGOLOGY

## 2022-03-04 PROCEDURE — 258N000003 HC RX IP 258 OP 636: Performed by: STUDENT IN AN ORGANIZED HEALTH CARE EDUCATION/TRAINING PROGRAM

## 2022-03-04 PROCEDURE — 88305 TISSUE EXAM BY PATHOLOGIST: CPT | Mod: TC | Performed by: OTOLARYNGOLOGY

## 2022-03-04 RX ORDER — PROPOFOL 10 MG/ML
INJECTION, EMULSION INTRAVENOUS PRN
Status: DISCONTINUED | OUTPATIENT
Start: 2022-03-04 | End: 2022-03-04

## 2022-03-04 RX ORDER — FENTANYL CITRATE 50 UG/ML
25 INJECTION, SOLUTION INTRAMUSCULAR; INTRAVENOUS EVERY 5 MIN PRN
Status: DISCONTINUED | OUTPATIENT
Start: 2022-03-04 | End: 2022-03-04 | Stop reason: HOSPADM

## 2022-03-04 RX ORDER — ONDANSETRON 2 MG/ML
4 INJECTION INTRAMUSCULAR; INTRAVENOUS EVERY 30 MIN PRN
Status: DISCONTINUED | OUTPATIENT
Start: 2022-03-04 | End: 2022-03-04 | Stop reason: HOSPADM

## 2022-03-04 RX ORDER — PHENYLEPHRINE HCL IN 0.9% NACL 50MG/250ML
.5-1.25 PLASTIC BAG, INJECTION (ML) INTRAVENOUS CONTINUOUS
Status: DISCONTINUED | OUTPATIENT
Start: 2022-03-04 | End: 2022-03-05 | Stop reason: HOSPADM

## 2022-03-04 RX ORDER — AMOXICILLIN 250 MG
1 CAPSULE ORAL 2 TIMES DAILY
Status: DISCONTINUED | OUTPATIENT
Start: 2022-03-04 | End: 2022-03-05 | Stop reason: HOSPADM

## 2022-03-04 RX ORDER — OXYCODONE HCL 5 MG/5 ML
5 SOLUTION, ORAL ORAL
Status: COMPLETED | OUTPATIENT
Start: 2022-03-04 | End: 2022-03-04

## 2022-03-04 RX ORDER — LIDOCAINE HYDROCHLORIDE 20 MG/ML
INJECTION, SOLUTION INFILTRATION; PERINEURAL PRN
Status: DISCONTINUED | OUTPATIENT
Start: 2022-03-04 | End: 2022-03-04

## 2022-03-04 RX ORDER — ONDANSETRON 2 MG/ML
INJECTION INTRAMUSCULAR; INTRAVENOUS PRN
Status: DISCONTINUED | OUTPATIENT
Start: 2022-03-04 | End: 2022-03-04

## 2022-03-04 RX ORDER — HYDROMORPHONE HCL IN WATER/PF 6 MG/30 ML
0.2 PATIENT CONTROLLED ANALGESIA SYRINGE INTRAVENOUS
Status: DISCONTINUED | OUTPATIENT
Start: 2022-03-04 | End: 2022-03-05 | Stop reason: HOSPADM

## 2022-03-04 RX ORDER — ONDANSETRON 4 MG/1
4 TABLET, ORALLY DISINTEGRATING ORAL EVERY 6 HOURS PRN
Status: DISCONTINUED | OUTPATIENT
Start: 2022-03-04 | End: 2022-03-05 | Stop reason: HOSPADM

## 2022-03-04 RX ORDER — POLYETHYLENE GLYCOL 3350 17 G/17G
17 POWDER, FOR SOLUTION ORAL DAILY
Status: DISCONTINUED | OUTPATIENT
Start: 2022-03-05 | End: 2022-03-05 | Stop reason: HOSPADM

## 2022-03-04 RX ORDER — HYDROMORPHONE HCL IN WATER/PF 6 MG/30 ML
0.2 PATIENT CONTROLLED ANALGESIA SYRINGE INTRAVENOUS EVERY 5 MIN PRN
Status: DISCONTINUED | OUTPATIENT
Start: 2022-03-04 | End: 2022-03-04 | Stop reason: HOSPADM

## 2022-03-04 RX ORDER — NALOXONE HYDROCHLORIDE 0.4 MG/ML
0.2 INJECTION, SOLUTION INTRAMUSCULAR; INTRAVENOUS; SUBCUTANEOUS
Status: DISCONTINUED | OUTPATIENT
Start: 2022-03-04 | End: 2022-03-05 | Stop reason: HOSPADM

## 2022-03-04 RX ORDER — DEXAMETHASONE SODIUM PHOSPHATE 4 MG/ML
INJECTION, SOLUTION INTRA-ARTICULAR; INTRALESIONAL; INTRAMUSCULAR; INTRAVENOUS; SOFT TISSUE PRN
Status: DISCONTINUED | OUTPATIENT
Start: 2022-03-04 | End: 2022-03-04

## 2022-03-04 RX ORDER — NALOXONE HYDROCHLORIDE 0.4 MG/ML
0.4 INJECTION, SOLUTION INTRAMUSCULAR; INTRAVENOUS; SUBCUTANEOUS
Status: DISCONTINUED | OUTPATIENT
Start: 2022-03-04 | End: 2022-03-05 | Stop reason: HOSPADM

## 2022-03-04 RX ORDER — LIDOCAINE HYDROCHLORIDE 40 MG/ML
SOLUTION TOPICAL PRN
Status: DISCONTINUED | OUTPATIENT
Start: 2022-03-04 | End: 2022-03-04 | Stop reason: HOSPADM

## 2022-03-04 RX ORDER — FENTANYL CITRATE 50 UG/ML
INJECTION, SOLUTION INTRAMUSCULAR; INTRAVENOUS PRN
Status: DISCONTINUED | OUTPATIENT
Start: 2022-03-04 | End: 2022-03-04

## 2022-03-04 RX ORDER — SODIUM CHLORIDE, SODIUM LACTATE, POTASSIUM CHLORIDE, CALCIUM CHLORIDE 600; 310; 30; 20 MG/100ML; MG/100ML; MG/100ML; MG/100ML
INJECTION, SOLUTION INTRAVENOUS CONTINUOUS
Status: DISCONTINUED | OUTPATIENT
Start: 2022-03-04 | End: 2022-03-04 | Stop reason: HOSPADM

## 2022-03-04 RX ORDER — LIDOCAINE HYDROCHLORIDE AND EPINEPHRINE 10; 10 MG/ML; UG/ML
INJECTION, SOLUTION INFILTRATION; PERINEURAL PRN
Status: DISCONTINUED | OUTPATIENT
Start: 2022-03-04 | End: 2022-03-04 | Stop reason: HOSPADM

## 2022-03-04 RX ORDER — LIDOCAINE 40 MG/G
CREAM TOPICAL
Status: DISCONTINUED | OUTPATIENT
Start: 2022-03-04 | End: 2022-03-04 | Stop reason: HOSPADM

## 2022-03-04 RX ORDER — ACETAMINOPHEN 325 MG/1
650 TABLET ORAL EVERY 4 HOURS PRN
Status: DISCONTINUED | OUTPATIENT
Start: 2022-03-07 | End: 2022-03-05 | Stop reason: HOSPADM

## 2022-03-04 RX ORDER — OXYCODONE HYDROCHLORIDE 5 MG/1
5 TABLET ORAL EVERY 6 HOURS PRN
Qty: 12 TABLET | Refills: 0 | Status: SHIPPED | OUTPATIENT
Start: 2022-03-04 | End: 2022-03-05

## 2022-03-04 RX ORDER — AMOXICILLIN 250 MG
1 CAPSULE ORAL 2 TIMES DAILY
Qty: 14 TABLET | Refills: 0 | Status: SHIPPED | OUTPATIENT
Start: 2022-03-04 | End: 2022-03-11

## 2022-03-04 RX ORDER — FENTANYL CITRATE 50 UG/ML
25 INJECTION, SOLUTION INTRAMUSCULAR; INTRAVENOUS
Status: DISCONTINUED | OUTPATIENT
Start: 2022-03-04 | End: 2022-03-04 | Stop reason: HOSPADM

## 2022-03-04 RX ORDER — ACETAMINOPHEN 325 MG/1
975 TABLET ORAL EVERY 8 HOURS
Status: DISCONTINUED | OUTPATIENT
Start: 2022-03-04 | End: 2022-03-05 | Stop reason: HOSPADM

## 2022-03-04 RX ORDER — PROCHLORPERAZINE MALEATE 5 MG
10 TABLET ORAL EVERY 6 HOURS PRN
Status: DISCONTINUED | OUTPATIENT
Start: 2022-03-04 | End: 2022-03-05 | Stop reason: HOSPADM

## 2022-03-04 RX ORDER — EPHEDRINE SULFATE 50 MG/ML
INJECTION, SOLUTION INTRAMUSCULAR; INTRAVENOUS; SUBCUTANEOUS PRN
Status: DISCONTINUED | OUTPATIENT
Start: 2022-03-04 | End: 2022-03-04

## 2022-03-04 RX ORDER — MEPERIDINE HYDROCHLORIDE 25 MG/ML
12.5 INJECTION INTRAMUSCULAR; INTRAVENOUS; SUBCUTANEOUS
Status: DISCONTINUED | OUTPATIENT
Start: 2022-03-04 | End: 2022-03-04 | Stop reason: HOSPADM

## 2022-03-04 RX ORDER — LIDOCAINE 40 MG/G
CREAM TOPICAL
Status: DISCONTINUED | OUTPATIENT
Start: 2022-03-04 | End: 2022-03-05 | Stop reason: HOSPADM

## 2022-03-04 RX ORDER — ONDANSETRON 4 MG/1
4 TABLET, ORALLY DISINTEGRATING ORAL EVERY 30 MIN PRN
Status: DISCONTINUED | OUTPATIENT
Start: 2022-03-04 | End: 2022-03-04 | Stop reason: HOSPADM

## 2022-03-04 RX ORDER — OXYCODONE HCL 5 MG/5 ML
5 SOLUTION, ORAL ORAL EVERY 4 HOURS PRN
Status: DISCONTINUED | OUTPATIENT
Start: 2022-03-04 | End: 2022-03-04

## 2022-03-04 RX ORDER — OXYCODONE HYDROCHLORIDE 5 MG/1
5 TABLET ORAL EVERY 4 HOURS PRN
Status: DISCONTINUED | OUTPATIENT
Start: 2022-03-04 | End: 2022-03-04 | Stop reason: HOSPADM

## 2022-03-04 RX ORDER — SODIUM CHLORIDE, SODIUM LACTATE, POTASSIUM CHLORIDE, CALCIUM CHLORIDE 600; 310; 30; 20 MG/100ML; MG/100ML; MG/100ML; MG/100ML
INJECTION, SOLUTION INTRAVENOUS CONTINUOUS PRN
Status: DISCONTINUED | OUTPATIENT
Start: 2022-03-04 | End: 2022-03-04

## 2022-03-04 RX ORDER — OXYCODONE HYDROCHLORIDE 5 MG/1
5 TABLET ORAL EVERY 4 HOURS PRN
Status: DISCONTINUED | OUTPATIENT
Start: 2022-03-04 | End: 2022-03-05 | Stop reason: HOSPADM

## 2022-03-04 RX ORDER — CEFAZOLIN SODIUM/WATER 2 G/20 ML
2 SYRINGE (ML) INTRAVENOUS SEE ADMIN INSTRUCTIONS
Status: DISCONTINUED | OUTPATIENT
Start: 2022-03-04 | End: 2022-03-04 | Stop reason: HOSPADM

## 2022-03-04 RX ORDER — OXYCODONE HYDROCHLORIDE 10 MG/1
10 TABLET ORAL EVERY 4 HOURS PRN
Status: DISCONTINUED | OUTPATIENT
Start: 2022-03-04 | End: 2022-03-05 | Stop reason: HOSPADM

## 2022-03-04 RX ORDER — CEFAZOLIN SODIUM 1 G/3ML
1 INJECTION, POWDER, FOR SOLUTION INTRAMUSCULAR; INTRAVENOUS EVERY 8 HOURS
Status: COMPLETED | OUTPATIENT
Start: 2022-03-04 | End: 2022-03-05

## 2022-03-04 RX ORDER — ACETAMINOPHEN 325 MG/10.15ML
650 LIQUID ORAL
Status: COMPLETED | OUTPATIENT
Start: 2022-03-04 | End: 2022-03-04

## 2022-03-04 RX ORDER — ONDANSETRON 2 MG/ML
4 INJECTION INTRAMUSCULAR; INTRAVENOUS EVERY 6 HOURS PRN
Status: DISCONTINUED | OUTPATIENT
Start: 2022-03-04 | End: 2022-03-05 | Stop reason: HOSPADM

## 2022-03-04 RX ORDER — BISACODYL 10 MG
10 SUPPOSITORY, RECTAL RECTAL DAILY PRN
Status: DISCONTINUED | OUTPATIENT
Start: 2022-03-04 | End: 2022-03-05 | Stop reason: HOSPADM

## 2022-03-04 RX ORDER — CEFAZOLIN SODIUM/WATER 2 G/20 ML
2 SYRINGE (ML) INTRAVENOUS
Status: COMPLETED | OUTPATIENT
Start: 2022-03-04 | End: 2022-03-04

## 2022-03-04 RX ORDER — HYDROMORPHONE HCL IN WATER/PF 6 MG/30 ML
0.4 PATIENT CONTROLLED ANALGESIA SYRINGE INTRAVENOUS
Status: DISCONTINUED | OUTPATIENT
Start: 2022-03-04 | End: 2022-03-05 | Stop reason: HOSPADM

## 2022-03-04 RX ADMIN — OXYCODONE HYDROCHLORIDE 5 MG: 5 SOLUTION ORAL at 15:14

## 2022-03-04 RX ADMIN — ROCURONIUM BROMIDE 50 MG: 50 INJECTION, SOLUTION INTRAVENOUS at 07:47

## 2022-03-04 RX ADMIN — FENTANYL CITRATE 50 MCG: 50 INJECTION, SOLUTION INTRAMUSCULAR; INTRAVENOUS at 07:58

## 2022-03-04 RX ADMIN — MIDAZOLAM 2 MG: 1 INJECTION INTRAMUSCULAR; INTRAVENOUS at 07:36

## 2022-03-04 RX ADMIN — ACETAMINOPHEN 975 MG: 325 TABLET ORAL at 18:42

## 2022-03-04 RX ADMIN — OXYCODONE HYDROCHLORIDE 10 MG: 10 TABLET ORAL at 19:42

## 2022-03-04 RX ADMIN — PHENYLEPHRINE HYDROCHLORIDE 150 MCG: 10 INJECTION INTRAVENOUS at 09:10

## 2022-03-04 RX ADMIN — PHENYLEPHRINE HYDROCHLORIDE 50 MCG: 10 INJECTION INTRAVENOUS at 08:54

## 2022-03-04 RX ADMIN — PHENYLEPHRINE HYDROCHLORIDE 100 MCG: 10 INJECTION INTRAVENOUS at 09:28

## 2022-03-04 RX ADMIN — LIDOCAINE HYDROCHLORIDE 60 MG: 20 INJECTION, SOLUTION INFILTRATION; PERINEURAL at 07:46

## 2022-03-04 RX ADMIN — REMIFENTANIL HYDROCHLORIDE 0.03 MCG/KG/MIN: 1 INJECTION, POWDER, LYOPHILIZED, FOR SOLUTION INTRAVENOUS at 08:22

## 2022-03-04 RX ADMIN — HYDROMORPHONE HYDROCHLORIDE 0.2 MG: 0.2 INJECTION, SOLUTION INTRAMUSCULAR; INTRAVENOUS; SUBCUTANEOUS at 10:58

## 2022-03-04 RX ADMIN — ROCURONIUM BROMIDE 10 MG: 50 INJECTION, SOLUTION INTRAVENOUS at 08:36

## 2022-03-04 RX ADMIN — Medication 5 MG: at 08:10

## 2022-03-04 RX ADMIN — Medication 2 G: at 07:55

## 2022-03-04 RX ADMIN — FENTANYL CITRATE 100 MCG: 50 INJECTION, SOLUTION INTRAMUSCULAR; INTRAVENOUS at 10:09

## 2022-03-04 RX ADMIN — Medication 5 MG: at 09:12

## 2022-03-04 RX ADMIN — SUGAMMADEX 200 MG: 100 INJECTION, SOLUTION INTRAVENOUS at 10:01

## 2022-03-04 RX ADMIN — CEFAZOLIN 1 G: 1 INJECTION, POWDER, FOR SOLUTION INTRAMUSCULAR; INTRAVENOUS at 15:58

## 2022-03-04 RX ADMIN — HYDROMORPHONE HYDROCHLORIDE 0.2 MG: 0.2 INJECTION, SOLUTION INTRAMUSCULAR; INTRAVENOUS; SUBCUTANEOUS at 10:53

## 2022-03-04 RX ADMIN — ROCURONIUM BROMIDE 10 MG: 50 INJECTION, SOLUTION INTRAVENOUS at 07:57

## 2022-03-04 RX ADMIN — FENTANYL CITRATE 50 MCG: 50 INJECTION, SOLUTION INTRAMUSCULAR; INTRAVENOUS at 10:03

## 2022-03-04 RX ADMIN — SODIUM CHLORIDE, POTASSIUM CHLORIDE, SODIUM LACTATE AND CALCIUM CHLORIDE: 600; 310; 30; 20 INJECTION, SOLUTION INTRAVENOUS at 07:40

## 2022-03-04 RX ADMIN — SODIUM CHLORIDE, SODIUM LACTATE, POTASSIUM CHLORIDE, CALCIUM CHLORIDE: 600; 310; 30; 20 INJECTION, SOLUTION INTRAVENOUS at 08:00

## 2022-03-04 RX ADMIN — ACETAMINOPHEN 650 MG: 325 SOLUTION ORAL at 11:36

## 2022-03-04 RX ADMIN — Medication 5 MG: at 08:29

## 2022-03-04 RX ADMIN — Medication 5 MG: at 08:50

## 2022-03-04 RX ADMIN — SODIUM CHLORIDE, POTASSIUM CHLORIDE, SODIUM LACTATE AND CALCIUM CHLORIDE: 600; 310; 30; 20 INJECTION, SOLUTION INTRAVENOUS at 11:00

## 2022-03-04 RX ADMIN — OXYCODONE HYDROCHLORIDE 5 MG: 5 SOLUTION ORAL at 11:36

## 2022-03-04 RX ADMIN — HYDROMORPHONE HYDROCHLORIDE 0.2 MG: 0.2 INJECTION, SOLUTION INTRAMUSCULAR; INTRAVENOUS; SUBCUTANEOUS at 10:38

## 2022-03-04 RX ADMIN — DEXAMETHASONE SODIUM PHOSPHATE 10 MG: 4 INJECTION, SOLUTION INTRA-ARTICULAR; INTRALESIONAL; INTRAMUSCULAR; INTRAVENOUS; SOFT TISSUE at 07:56

## 2022-03-04 RX ADMIN — HYDROMORPHONE HYDROCHLORIDE 0.2 MG: 0.2 INJECTION, SOLUTION INTRAMUSCULAR; INTRAVENOUS; SUBCUTANEOUS at 10:48

## 2022-03-04 RX ADMIN — Medication 5 MG: at 08:54

## 2022-03-04 RX ADMIN — HYDROMORPHONE HYDROCHLORIDE 0.2 MG: 0.2 INJECTION, SOLUTION INTRAMUSCULAR; INTRAVENOUS; SUBCUTANEOUS at 10:32

## 2022-03-04 RX ADMIN — PROPOFOL 170 MG: 10 INJECTION, EMULSION INTRAVENOUS at 07:46

## 2022-03-04 RX ADMIN — HYDROMORPHONE HYDROCHLORIDE 0.2 MG: 0.2 INJECTION, SOLUTION INTRAMUSCULAR; INTRAVENOUS; SUBCUTANEOUS at 10:43

## 2022-03-04 RX ADMIN — ONDANSETRON 4 MG: 2 INJECTION INTRAMUSCULAR; INTRAVENOUS at 09:31

## 2022-03-04 RX ADMIN — FENTANYL CITRATE 50 MCG: 50 INJECTION, SOLUTION INTRAMUSCULAR; INTRAVENOUS at 08:02

## 2022-03-04 RX ADMIN — ROCURONIUM BROMIDE 20 MG: 50 INJECTION, SOLUTION INTRAVENOUS at 08:23

## 2022-03-04 ASSESSMENT — ACTIVITIES OF DAILY LIVING (ADL)
ADLS_ACUITY_SCORE: 12
TOILETING_ISSUES: NO
FALL_HISTORY_WITHIN_LAST_SIX_MONTHS: NO
ADLS_ACUITY_SCORE: 12
ADLS_ACUITY_SCORE: 3
WALKING_OR_CLIMBING_STAIRS_DIFFICULTY: NO
CONCENTRATING,_REMEMBERING_OR_MAKING_DECISIONS_DIFFICULTY: NO
ADLS_ACUITY_SCORE: 12
DRESSING/BATHING_DIFFICULTY: NO
DOING_ERRANDS_INDEPENDENTLY_DIFFICULTY: NO
DIFFICULTY_EATING/SWALLOWING: NO
ADLS_ACUITY_SCORE: 12
ADLS_ACUITY_SCORE: 3
WEAR_GLASSES_OR_BLIND: NO
ADLS_ACUITY_SCORE: 12

## 2022-03-04 NOTE — ANESTHESIA POSTPROCEDURE EVALUATION
Patient: Michael Thompson    Procedure: Procedure(s):  neck exploration, removal of neck mass  Direct laryngoscopy       Anesthesia Type:  No value filed.    Note:  Disposition: Inpatient   Postop Pain Control: Uneventful            Sign Out: Well controlled pain   PONV: No   Neuro/Psych: Uneventful            Sign Out: Acceptable/Baseline neuro status   Airway/Respiratory: Uneventful            Sign Out: Acceptable/Baseline resp. status   CV/Hemodynamics: Uneventful            Sign Out: Acceptable CV status; No obvious hypovolemia; No obvious fluid overload   Other NRE: NONE   DID A NON-ROUTINE EVENT OCCUR? No           Last vitals:  Vitals Value Taken Time   /76 03/04/22 1130   Temp 36.4  C (97.5  F) 03/04/22 1130   Pulse 101 03/04/22 1145   Resp 13 03/04/22 1130   SpO2 93 % 03/04/22 1153   Vitals shown include unvalidated device data.    Electronically Signed By: Erwin Case MD  March 4, 2022  11:55 AM

## 2022-03-04 NOTE — BRIEF OP NOTE
[facility]    Brief Operative Note    Pre-operative diagnosis: Laryngocele [Q31.3]  Thyroglossal duct cyst [Q89.2]  Post-operative diagnosis Same as pre-operative diagnosis    Procedure: Procedure(s):  neck exploration, removal of neck mass  Direct laryngoscopy  Surgeon: Surgeon(s) and Role:     * Janet Kidd MD - Primary     * Evelio Gomez MD - Assisting  Anesthesia: General   Estimated Blood Loss: 10    Drains: Nilson-Polo  Specimens:   ID Type Source Tests Collected by Time Destination   1 : Thyroglossal Duct Cyst Tissue Other SURGICAL PATHOLOGY EXAM Janet Kidd MD 3/4/2022  9:21 AM      Findings:   DL with ossof without mass in the R ventricle. R neck cyst tracked towards midline along hyoid consistent with TGDC. . Laryngocele not identified for removal  Complications: None.  Implants: * No implants in log *    40 yo M with TGDC s/p sistrunk procedure on 3/4/22     N:   Oxy, tylenol    HEENT:   GREGG drain cares, remove tomorrow if <30cc  Monitor for crepitus  24 hours of ancef, discharge with 1 week of augmentin     CV:   No issues     Pulm:   Wean O2 as tolerated     GI:   CLD   Miralax, senna     ID:   abx ppx as above     Ppx:   SCD    Dispo: planned discharge 3/5 pending GREGG output    Merle Marte MD  Otolaryngology PGY2

## 2022-03-04 NOTE — DISCHARGE SUMMARY
Discharge Summary  Michael Thompson  [id number]  1980    Date of Admission: 3/4/2022  Date of Discharge: 3/5/2022    Admission Diagnosis: Laryngocele [Q31.3]  Thyroglossal duct cyst [Q89.2]  Discharge Diagnosis: Same    Procedures:  Date: 3/4/2022  Procedure(s):  neck exploration, removal of neck mass  Direct laryngoscopy    HPI: Michael Thompson is a 41 year old male with history of a neck mass causing pain and clicking with CT findings of a midline neck mass and right neck mixed laryngocele. It was recommended that he undergo operative intervention and the patient consented to the above procedure after detailed explanation of the risks and benefits of said procedure.    Hospital Course: The patient was admitted to the hospital and underwent the above mentioned procedure. He tolerated the procedure without any intra- or ismael-operative complications. Please see the operative report for full details of the procedure. The patient was admitted for post-operative monitoring. His postoperative course was uneventful. His GREGG drain was removed prior to discharge.  At discharge, the patient's pain was well controlled, the patient was voiding on his own, and was ambulating and tolerating a regular diet.     Discharge Exam:  Vitals:    03/04/22 1230 03/04/22 1340 03/04/22 1345 03/04/22 1515   BP:  112/70 112/70 109/65   Pulse: 92 90  86   Resp: 16 14  20   Temp:  98.4  F (36.9  C)     TempSrc:  Oral     SpO2: 95% 95% 95% 95%   Weight:       Height:           General: A&O x 3, No acute distress  HEENT: PERRL, EOMI without spontaneous or gaze evoked nystagmus. Incision is clean, dry, and intact; no bleeding no drainage. GREGG drain removed.   Respiratory: Breathing non-labored on room air, no stridor, no accessory muscle use.     Discharge Medications:     Medication List      Started    amoxicillin-clavulanate 875-125 MG tablet  Commonly known as: AUGMENTIN  1 tablet, Oral, 2 TIMES DAILY     oxyCODONE 5 MG  tablet  Commonly known as: ROXICODONE  5 mg, Oral, EVERY 6 HOURS PRN     senna-docusate 8.6-50 MG tablet  Commonly known as: SENOKOT-S/PERICOLACE  1 tablet, Oral, 2 TIMES DAILY            Discharge Procedure Orders   Discharge Instructions - Lifting restrictions   Order Comments: Lifting Restrictions <10 pounds until seen at Post-op follow up appointment     Diet Instructions   Order Comments: Resume pre procedure diet     Wound care   Order Comments: Keep dressing clean and dry and intact. You may shower in 24 hours after your drain was removed. Leave the steristrips in place and they will fall off on their own. Pat your wound dry if it gets wet.     Reason for your hospital stay   Order Comments: Post-operative recovery     Activity   Order Comments: Your activity upon discharge: no driving while on analgesics     Order Specific Question Answer Comments   Is discharge order? Yes      [facility]ult [facility] Follow-up and recommended labs and tests   Order Comments: Follow up in ENT clinic with Dr. Kidd on 3/11 at 9AM. Please call the clinic with questions/concerns: [phone].    Otolaryngology/ENT Clinic:  [facility]  [address] [place] [address]     Diet   Order Comments: Follow this diet upon discharge: Regular diet     Order Specific Question Answer Comments   Is discharge order? Yes        Dispo: To home in good condition. All of the patient's questions/concerns have been addressed at this time. They are comfortable and independent in all cares.     Merle Marte MD  Otolaryngology-Head & Neck Surgery, PGY-2  Please contact ENT by dialing * [phone] and entering job code [phone].

## 2022-03-04 NOTE — OP NOTE
Operative Note   Otolaryngology - Head and Neck Surgery       DATE OF OPERATION:   March 4, 2022    PREOPERATIVE DIAGNOSIS:   Right neck mass  Right laryngocele     POSTOPERATIVE DIAGNOSIS:   Same    NAME OF OPERATION:   1. Microdirect suspension laryngoscopy  2. Neck exploration and sistrunk procedure    ANESTHESIA  Type: General  ETT: 5.0 north    SURGEON:   Janet Kidd MD    CO-SURGEON:   Adelaide Gomez MD    RESIDENT SURGEON(S):   Merle Marte MD    INDICATIONS FOR PROCEDURE:   The patient is a 41 year old male with a right neck mass causing pain and clicking with CT neck findings of a midline neck mass and right neck mixed laryngocele. The patient comes in for neck exploration and removal of the mass. Risks, benefits and alternatives were discussed. The patient wishes to proceed with surgery and has signed an informed consent.     FINDINGS:   1. Exposure with ossoff laryngoscope, no mass in the right ventricle  2. 3x1cm mass extending to the hyoid consistent with a thyroglossal duct cyst  3. Laryngocele not palpated     DESCRIPTION OF PROCEDURE:   The patient was brought into the operating room and placed supine on the operating room table. A time-out was performed. General anesthesia was induced. The patient was an easy mask ventilated. Then the patient was intubated with a narayanan blade and 5.0 onrth MLT ETT.     The head was drapped in the usual fashion. Then the dentition and mucosa were inspected prior to start. A reinforced tooth guard was placed on the upper dentition. The ossoff laryngoscope was carefully introduced into the oral cavity and gently passed to the oropharynx. Here the larynx was exposed and the patient was placed in suspension.     This revealed no lesions in the right ventricle. Photodocumentation was performed.     The surgical site was then inspected and no bleeding was seen. The patient was taken out of suspension. The instrumentation was carefully removed, examining the  mucosa and dentition on the way it. The dental guard was removed, and the mucosa and dentition were seen to be in their preoperative state at the conclusion of the procedure.     A shoulder roll was placed to allow extension of the neck. Then a 4cm incision was outlined in a natural skin crease between the hyoid and thyroid cartilage. Then the neck was infiltrated with 1% lidocaine with 1:100,000 epinephrine. Then an incision was made with a 15 blade through the platysma layer. Then subplatysmal flaps were elevated. The strap muscles were divided at the midline raphe from the hyoid to the bottom of the thyroid cartilage. Then the mass came into view. Careful dissection was done around the mass and it was seen to have a stalk tracking to the hyoid consistent with a thyroglossal duct cyst. The strap muscle attachments to the midline of the hyoid were bovied off and the midline of the hyoid was cut with a bone cutter. The tract was then sutured. The specimen was passed off for histopathologic evaluation.    Then the surface of the superior thyroid ala was inspected and no obvious laryngocele was seen. Therefore this was the end of our procedure.     The wound bed was irrigated copiously. No air bubbles were seen. Then, the strap muscles were reapproximated with 3.0 vicryl sutures. The platysma muscle was reapproximated with 4.0 vicryl sutures. A GREGG was left under the strap muscles and the skin edges were repproximated with 5.0 subcutaneous monocryl suture. The skin edges were further covered with mastosol and steri strips.    This was the end of procedure. The patient was turned back to the care of anesthesia who awoke the patient without complications.     COMPLICATIONS:   None.  .   ESTIMATED BLOOD LOSS:   Less than 10cc    DISPOSITION:   PACU.    SPECIMENS:  ID Type Source Tests Collected by Time Destination   1 : Thyroglossal Duct Cyst Tissue Other SURGICAL PATHOLOGY EXAM Janet Kidd MD 3/4/2022  9:21 AM            PHOTODOCUMENTATION:

## 2022-03-04 NOTE — ANESTHESIA PROCEDURE NOTES
Airway       Patient location during procedure: OR       Procedure Start/Stop Times: 3/4/2022 7:49 AM  Staff -        CRNA: Anahi Grace APRN CRNA       Performed By: CRNA  Consent for Airway        Urgency: elective  Indications and Patient Condition       Indications for airway management: ismael-procedural       Induction type:intravenous       Mask difficulty assessment: 1 - vent by mask    Final Airway Details       Final airway type: endotracheal airway       Successful airway: ETT - single (Simone tube)  Endotracheal Airway Details        ETT size (mm): 5.0       Cuffed: yes       Successful intubation technique: direct laryngoscopy       DL Blade Type: Decker 2       Grade View of Cords: 1       Adjucts: stylet       Position: Right       Measured from: lips       Secured at (cm): 24       Bite block used: None    Post intubation assessment        Placement verified by: capnometry, equal breath sounds and chest rise        Number of attempts at approach: 1       Secured with: pink tape       Ease of procedure: easy       Dentition: Intact and Unchanged    Additional Comments       5.0 Simone tube per ENT request

## 2022-03-04 NOTE — ANESTHESIA CARE TRANSFER NOTE
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    Anesthesia Type:   No value filed.     Note:    Oropharynx: oropharynx clear of all foreign objects and spontaneously breathing  Level of Consciousness: awake  Oxygen Supplementation: face mask  Level of Supplemental Oxygen (L/min / FiO2): 6  Independent Airway: airway patency satisfactory and stable  Dentition: dentition unchanged  Vital Signs Stable: post-procedure vital signs reviewed and stable  Report to RN Given: handoff report given  Patient transferred to: PACU    Handoff Report: Identifed the Patient, Identified the Reponsible Provider, Reviewed the pertinent medical history, Discussed the surgical course, Reviewed Intra-OP anesthesia mangement and issues during anesthesia, Set expectations for post-procedure period and Allowed opportunity for questions and acknowledgement of understanding      Vitals:  Vitals Value Taken Time   /85 03/04/22 1010   Temp     Pulse 102 03/04/22 1010   Resp 12    SpO2 100 % 03/04/22 1012   Vitals shown include unvalidated device data.    Electronically Signed By: CRISTINA Angel CRNA  March 4, 2022  10:13 AM

## 2022-03-04 NOTE — ANESTHESIA PREPROCEDURE EVALUATION
Anesthesia Pre-Procedure Evaluation    Patient: Michael Thompson   MRN: [id number] : 1980        Procedure : Procedure(s):  neck exploration, removal of neck mass  Direct laryngoscopy          No past medical history on file.   Past Surgical History:   Procedure Laterality Date     HERNIA REPAIR       IR FINE NEEDLE ASPIRATION W ULTRASOUND  2022     lump removal  2013    right upper back     TONSILLECTOMY, ADENOIDECTOMY, COMBINED        No Known Allergies   Social History     Tobacco Use     Smoking status: Never Smoker     Smokeless tobacco: Never Used   Substance Use Topics     Alcohol use: Yes     Comment: social      Wt Readings from Last 1 Encounters:   22 68.3 kg (150 lb 9.2 oz)        Anesthesia Evaluation   Pt has had prior anesthetic.     No history of anesthetic complications       ROS/MED HX  ENT/Pulmonary:  - neg pulmonary ROS     Neurologic:  - neg neurologic ROS     Cardiovascular:  - neg cardiovascular ROS     METS/Exercise Tolerance: >4 METS    Hematologic:  - neg hematologic  ROS     Musculoskeletal:  - neg musculoskeletal ROS     GI/Hepatic:  - neg GI/hepatic ROS     Renal/Genitourinary:  - neg Renal ROS     Endo:  - neg endo ROS     Psychiatric/Substance Use:  - neg psychiatric ROS     Infectious Disease:  - neg infectious disease ROS     Malignancy:  - neg malignancy ROS     Other:            Physical Exam    Airway        Mallampati: II   TM distance: > 3 FB   Neck ROM: full   Mouth opening: > 3 cm    Respiratory Devices and Support         Dental  no notable dental history         Cardiovascular          Rhythm and rate: regular and normal     Pulmonary           breath sounds clear to auscultation           OUTSIDE LABS:  CBC:   Lab Results   Component Value Date    WBC 4.8 2022    WBC 5.1 2021    HGB 16.0 2022    HGB 15.5 2022    HCT 45.4 2022    HCT 46.2 2021     2022     2021     BMP:   Lab Results    Component Value Date    POTASSIUM 4.2 02/17/2022    CR 0.84 02/17/2022     (H) 03/04/2022    GLC 89 01/20/2021     COAGS:   Lab Results   Component Value Date    INR 1.08 01/06/2022     POC: No results found for: BGM, HCG, HCGS  HEPATIC: No results found for: ALBUMIN, PROTTOTAL, ALT, AST, GGT, ALKPHOS, BILITOTAL, BILIDIRECT, SARAN  OTHER: No results found for: PH, LACT, A1C, KIRTI, PHOS, MAG, LIPASE, AMYLASE, TSH, T4, T3, CRP, SED    Anesthesia Plan    ASA Status:  1      Anesthesia Type: General.     - Airway: ETT   Induction: Intravenous.   Maintenance: Balanced.   Techniques and Equipment:     - Lines/Monitors: 2nd IV     Consents    Anesthesia Plan(s) and associated risks, benefits, and realistic alternatives discussed. Questions answered and patient/representative(s) expressed understanding.    - Discussed:     - Discussed with:  Patient      - Extended Intubation/Ventilatory Support Discussed: No.      - Patient is DNR/DNI Status: No    Use of blood products discussed: No .     Postoperative Care    Pain management: Multi-modal analgesia.   PONV prophylaxis: Ondansetron (or other 5HT-3), Dexamethasone or Solumedrol     Comments:                Edgar Nichole MD   normal...

## 2022-03-05 VITALS
TEMPERATURE: 97.6 F | BODY MASS INDEX: 21.08 KG/M2 | OXYGEN SATURATION: 99 % | WEIGHT: 150.57 LBS | SYSTOLIC BLOOD PRESSURE: 116 MMHG | HEIGHT: 71 IN | HEART RATE: 72 BPM | DIASTOLIC BLOOD PRESSURE: 71 MMHG | RESPIRATION RATE: 16 BRPM

## 2022-03-05 PROCEDURE — 250N000011 HC RX IP 250 OP 636: Performed by: STUDENT IN AN ORGANIZED HEALTH CARE EDUCATION/TRAINING PROGRAM

## 2022-03-05 PROCEDURE — 250N000013 HC RX MED GY IP 250 OP 250 PS 637: Performed by: STUDENT IN AN ORGANIZED HEALTH CARE EDUCATION/TRAINING PROGRAM

## 2022-03-05 PROCEDURE — 31526 DX LARYNGOSCOPY W/OPER SCOPE: CPT | Mod: 51 | Performed by: OTOLARYNGOLOGY

## 2022-03-05 RX ORDER — OXYCODONE HCL 5 MG/5 ML
5 SOLUTION, ORAL ORAL EVERY 6 HOURS PRN
Qty: 100 ML | Refills: 0 | Status: SHIPPED | OUTPATIENT
Start: 2022-03-05 | End: 2022-03-10

## 2022-03-05 RX ADMIN — OXYCODONE HYDROCHLORIDE 10 MG: 10 TABLET ORAL at 00:30

## 2022-03-05 RX ADMIN — ACETAMINOPHEN 975 MG: 325 TABLET ORAL at 07:39

## 2022-03-05 RX ADMIN — CEFAZOLIN 1 G: 1 INJECTION, POWDER, FOR SOLUTION INTRAMUSCULAR; INTRAVENOUS at 07:39

## 2022-03-05 RX ADMIN — CEFAZOLIN 1 G: 1 INJECTION, POWDER, FOR SOLUTION INTRAMUSCULAR; INTRAVENOUS at 00:32

## 2022-03-05 RX ADMIN — OXYCODONE HYDROCHLORIDE 10 MG: 10 TABLET ORAL at 05:54

## 2022-03-05 ASSESSMENT — ACTIVITIES OF DAILY LIVING (ADL)
ADLS_ACUITY_SCORE: 3

## 2022-03-05 NOTE — PROGRESS NOTES
Discharge time/date: 3/5/2022 1010  Walked or Wheelchair: Walked  PIV removed: Yes  Reviewed AVS with patient: Yes  Medication due times added to AVS in EPIC: Yes  Verbalized understanding of discharge with teachback: Yes  Medications retrieved from pharmacy: Yes  Supplies sent home: NA  Belongings from security with patient: NA

## 2022-03-05 NOTE — PLAN OF CARE
Status: S/P removal of neck mass and direct laryngoscopy  Vitals: VSS on RA  Neuros: A&Ox4. Intact. Strengths 5/5  IV: PIV SL   Resp/trach: LS clear, on RA  Diet: Regular  Bowel status: LBM PTA  : Voiding spontaneously  Skin: Two incisions on anterior neck. Covered with primapore. CDI   Pain: Mild to moderate pain partially controlled with oxycodone. Mostly with swallowing  Activity: Up SBA  Plan: Continue to monitor and follow POC.     Arrived from: Arrived from PACU to 6A at 1800   Belongings/meds: With patient  2 RN Skin Assessment Completed by: Harry RN and Gauri CUMMINS  Non-intact findings documented (yes/no/NA): See above

## 2022-03-08 LAB
PATH REPORT.COMMENTS IMP SPEC: NORMAL
PATH REPORT.COMMENTS IMP SPEC: NORMAL
PATH REPORT.FINAL DX SPEC: NORMAL
PATH REPORT.GROSS SPEC: NORMAL
PATH REPORT.MICROSCOPIC SPEC OTHER STN: NORMAL
PATH REPORT.RELEVANT HX SPEC: NORMAL
PHOTO IMAGE: NORMAL

## 2022-03-08 NOTE — PROGRESS NOTES
[facility]   at the [facility]   Otolaryngology Clinic     Patient: Michael Thompson    MRN: [id number]    : 1980    Age/Gender: 41 year old male  Date of Service: 3/11/2022  Rendering Provider:   Janet Kidd MD     Chief Complaint   Neck mass  S/p FNA 23  S/p sistrunk 3/4/22  Interval History   HISTORY OF PRESENT ILLNESS: Mr. Thompson is a 41 year old male is being followed for neck mass. he was initially seen on 21. Please refer to this note for full history.     Of note, the patient underwent tonsillectomy at age 23. This was done for recurrent infection and swelling. He underwent many rounds of antibiotics and were eventually unable to determine the etiology of this.    Today, he presents for follow up. he reports:  - he can't move the tongue to the side  - has trouble yawnig  - food is going well  - things too early to say if they are improved       PAST MEDICAL HISTORY: No past medical history on file.    PAST SURGICAL HISTORY:   Past Surgical History:   Procedure Laterality Date     EXCISE CYST THYROGLOSSAL DUCT Right 3/4/2022    Procedure: neck exploration, removal of neck mass;  Surgeon: Janet Kidd MD;  Location: [facility] OR     HERNIA REPAIR       IR FINE NEEDLE ASPIRATION W ULTRASOUND  2022     LARYNGOSCOPY, BRONCHOSCOPY, COMBINED N/A 3/4/2022    Procedure: Direct laryngoscopy;  Surgeon: Janet Kidd MD;  Location: [facility] OR     lump removal      right upper back     TONSILLECTOMY, ADENOIDECTOMY, COMBINED       CURRENT MEDICATIONS:   Current Outpatient Medications:      amoxicillin-clavulanate (AUGMENTIN) 875-125 MG tablet, Take 1 tablet by mouth 2 times daily for 7 days, Disp: 14 tablet, Rfl: 0     oxyCODONE (ROXICODONE) 5 MG/5ML solution, Take 5 mLs (5 mg) by mouth every 6 hours as needed for pain or moderate to severe pain, Disp: 100 mL, Rfl: 0     senna-docusate (SENOKOT-S/PERICOLACE) 8.6-50 MG tablet, Take 1 tablet by mouth 2 times daily for 7  days, Disp: 14 tablet, Rfl: 0    ALLERGIES: Patient has no known allergies.    SOCIAL HISTORY:    Social History     Socioeconomic History     Marital status: Single     Spouse name: Not on file     Number of children: Not on file     Years of education: Not on file     Highest education level: Not on file   Occupational History     Not on file   Tobacco Use     Smoking status: Never Smoker     Smokeless tobacco: Never Used   Vaping Use     Vaping Use: Never used   Substance and Sexual Activity     Alcohol use: Yes     Comment: social     Drug use: Never     Sexual activity: Yes     Partners: Female     Birth control/protection: None   Other Topics Concern     Parent/sibling w/ CABG, MI or angioplasty before 65F 55M? Not Asked   Social History Narrative     Not on file     Social Determinants of Health     Financial Resource Strain: Not on file   Food Insecurity: Not on file   Transportation Needs: Not on file   Physical Activity: Not on file   Stress: Not on file   Social Connections: Not on file   Intimate Partner Violence: Not on file   Housing Stability: Not on file       FAMILY HISTORY:   Family History   Problem Relation Age of Onset     Anxiety Disorder Mother      Prostate Cancer Maternal Grandfather      Cancer Paternal Grandfather       Non-contributory for problems with anesthesia    REVIEW OF SYSTEMS:   The patient was asked a 14 point review of systems regarding constitutional symptoms, eye symptoms, ears, nose, mouth, throat symptoms, cardiovascular symptoms, respiratory symptoms, gastrointestinal symptoms, genitourinary symptoms, musculoskeletal symptoms, integumentary symptoms, neurological symptoms, psychiatric symptoms, endocrine symptoms, hematologic/lymphatic symptoms, and allergic/ immunologic symptoms.   The pertinent factors have been included in the HPI and below.  Patient Supplied Answers to Review of Systems  No flowsheet data found.    Physical Examination   The patient underwent a  physical examination as described below. The pertinent positive and negative findings are summarized after the description of the examination.  Constitutional: The patient's developmental and nutritional status was assessed. The patient's voice quality was assessed.  Head and Face: The head and face were inspected for deformities. The sinuses were palpated. The salivary glands were palpated. Facial muscle strength was assessed bilaterally.  Eyes: Extraocular movements and primary gaze alignment were assessed.  Ears, Nose, Mouth and Throat: The ears and nose were examined for deformities. The nasal septum, mucosa, and turbinates were inspected by anterior rhinoscopy. The lips, teeth, and gums were examined for abnormalities. The oral mucosa, tongue, palate, tonsils, lateral and posterior pharynx were inspected for the presence of asymmetry or mucosal lesions.    Neck: The tracheal position was noted, and the neck mass palpated to determine if there were any asymmetries, abnormal neck masses, thyromegally, or thyroid nodules.  Respiratory: The nature of the breathing and chest expansion/symmetry was observed.  Cardiovascular: The patient was examined to determine the presence of any edema or jugular venous distension.  Abdomen: The contour of the abdomen was noted.  Lymphatic: The patient was examined for infraclavicular lymphadenopathy.  Musculoskeletal: The patient was inspected for the presence of skeletal deformities.  Extremities: The extremities were examined for any clubbing or cyanosis.  Skin: The skin was examined for inflammatory or neoplastic conditions.  Neurologic: The patient's orientation, mood, and affect were noted. The cranial nerve  functions were examined.  Other pertinent positive and negative findings on physical examination:   OC/OP: no lesions, uvula midline, soft palate elevates symmetrically, CN XII is normal  Neck: no lesions, incision healing well with steri strips  All other physical  examination findings were within normal limits and noncontributory.    Procedures   Flexible laryngoscopy (CPT 33874)      Pre-procedure diagnosis: dysphagia  Post-procedure diagnosis: same as above  Indication for procedure: Mr. Thompson is a 41 year old male with see above  Procedure(s): Fiberoptic Laryngoscopy    Details of Procedure: After informed consent was obtained, the patient was seated in the examination chair.  The areas of the nasopharynx as well as the hypopharynx were anesthetized with topical 4% lidocaine with 0.25% phenylephrine atomizer.  Examination of the base of tongue was performed first.  Attention was directed to any evidence of masses in the area or evidence of leukoplakia or candidal infection.  Attention was directed to the epiglottis where its size and position was determined and its movement on phonation of the vowel  e .  The piriform sinuses were then inspected for any mass lesions or pooling of secretions.  Attention was then directed to the larynx. The vocal folds were inspected for infection or any areas of leukoplakia, for masses, polypoid degeneration, or hemorrhage.  Having done this, the arytenoids and vocal processes were inspected for erythema or evidence of granuloma formation.  The posterior commissure was then inspected for evidence of inflammatory changes in the mucosa and heaping up of mucosal tissue. The patient was then instructed to say the vowel  e .  Adduction of vocal folds to the midline was observed for any evidence of paresis or paralysis of the larynx or asymmetry in rotation of the larynx to the left or right. The patient was asked to breathe and the degree of abduction was noted bilaterally.  Subglottic view of the larynx was obtained for any additional mass lesions or mucosal changes.  Finally the post cricoid was examined for evidence of pooling of secretions, as well as the pharyngeal wall mucosa.   Anesthesia type: 0.25%  phenylephrine    Findings:  Anatomic/physiological deviations: RNC, mild saliva, mild vocal fold edema on the left   Right vocal process: No restriction of mobility   Left vocal process: No restriction of mobility  Glottal gap: Complete glottal closure  Supraglottic structures: Normal  Hypopharynx: Normal     Estimated Blood Loss: minimal  Complications: None  Disposition: Patient tolerated the procedure well        Review of Relevant Clinical Data   I personally reviewed:  Pathology 3/4/22    Thyroglossal duct cyst, excision:  -Benign multilocular cyst with respiratory epithelium, consistent with thyroglossal duct cyst  -Associated benign thyroid tissue  -Benign thyroid bone          Labs:  No results found for: TSH  No results found for: NA, CO2, BUN, CREAT, GLUCOSE, PHOS  Lab Results   Component Value Date    WBC 4.8 2022    HGB 16.0 2022    HCT 45.4 2022    MCV 88 2022     2022     Lab Results   Component Value Date    INR 1.08 2022     No results found for: CORWIN  No components found for: RHEUMATOIDFACTOR,  RF  No results found for: CRP  No components found for: CKTOT, URICACID  No components found for: C3, C4, DSDNAAB, NDNAABIFA  No results found for: MPOAB    Patient reported Quality of Life (QOL) Measures   Patient Supplied Answers To VHI Questionnaire  No flowsheet data found.    Patient Supplied Answers To EAT Questionnaire  No flowsheet data found.    Patient Supplied Answers To CSI Questionnaire  No flowsheet data found.    Patient Supplied Answers to Dyspnea Index Questionnaire:  No flowsheet data found.    Impression & Plan     IMPRESSION: Mr. Thompson is a 41 year old male who is being seen for the followin. Neck mass  - started after right neck lymph node got large  - happened 2 months when sick with a cold  - had pain around it   - now gone but feels a pressure   - CT neck 21 with midline neck mass and right mixed laryngocele  - scope does not  show false vocal fold swelling  - discussed he has both the cyst and the laryngocele  - discussed proceeding with a FNA for the cyst and then deciding on observation vs removal  - s/p sistrunk procedure 3/4/22  - pathology consistent with thyroglossal duct cyst  - symptoms 3/11/22 are improved  - scope 3/11/22 mild saliva and swelling  - discussed continuing restrictions for up to 6 weeks and scar takes up to 1 year to heal  - discussed proceeding wit CT neck after 6 weeks to see if laryngocele is still present, would like to hold off since he has no indication to remove it  Plan  - continue wound care        RETURN VISIT: as needed in 6 weeks       Scribe Preparation Attestation:  Liya URBAN, a scribe, prepared the chart for today's encounter.         Jante Kidd MD    Laryngology    [facility]  Department of  Otolaryngology - Head and Neck Surgery  Clinics & Surgery Center  [address] [place] [id number]  Appointment line: [phone]  Fax: [phone]  [email or web address]

## 2022-03-11 ENCOUNTER — OFFICE VISIT (OUTPATIENT)
Dept: OTOLARYNGOLOGY | Facility: CLINIC | Age: 42
End: 2022-03-11
Payer: COMMERCIAL

## 2022-03-11 VITALS — HEART RATE: 95 BPM | HEIGHT: 71 IN | OXYGEN SATURATION: 100 % | WEIGHT: 150 LBS | BODY MASS INDEX: 21 KG/M2

## 2022-03-11 DIAGNOSIS — R49.0 DYSPHONIA: ICD-10-CM

## 2022-03-11 PROCEDURE — 99213 OFFICE O/P EST LOW 20 MIN: CPT | Mod: 24 | Performed by: OTOLARYNGOLOGY

## 2022-03-11 PROCEDURE — 31575 DIAGNOSTIC LARYNGOSCOPY: CPT | Mod: 79 | Performed by: OTOLARYNGOLOGY

## 2022-03-11 ASSESSMENT — PAIN SCALES - GENERAL: PAINLEVEL: NO PAIN (0)

## 2022-03-11 NOTE — NURSING NOTE
"Chief Complaint   Patient presents with     RECHECK     Follow up       Pulse 95, height 1.803 m (5' 11\"), weight 68 kg (150 lb), SpO2 100 %.    Bakari Dudley, EMT  "

## 2022-03-11 NOTE — LETTER
3/11/2022       RE: Michael Thompson  [address]  [facility] [address]     Dear Colleague,    Thank you for referring your patient, Michael Thompson, to the [facility] E[facility] [place] at [facility]. Please see a copy of my visit note below.        [facility]   at the [facility]   Otolaryngology Clinic     Patient: Michael Thompson    MRN: [id number]    : 1980    Age/Gender: 41 year old male  Date of Service: 3/11/2022  Rendering Provider:   Janet Kidd MD     Chief Complaint   Neck mass  S/p FNA 23  S/p sistrunk 3/4/22  Interval History   HISTORY OF PRESENT ILLNESS: Mr. Thompson is a 41 year old male is being followed for neck mass. he was initially seen on 21. Please refer to this note for full history.     Of note, the patient underwent tonsillectomy at age 23. This was done for recurrent infection and swelling. He underwent many rounds of antibiotics and were eventually unable to determine the etiology of this.    Today, he presents for follow up. he reports:  - he can't move the tongue to the side  - has trouble yawnig  - food is going well  - things too early to say if they are improved       PAST MEDICAL HISTORY: No past medical history on file.    PAST SURGICAL HISTORY:   Past Surgical History:   Procedure Laterality Date     EXCISE CYST THYROGLOSSAL DUCT Right 3/4/2022    Procedure: neck exploration, removal of neck mass;  Surgeon: Janet Kidd MD;  Location: [facility] OR     HERNIA REPAIR       IR FINE NEEDLE ASPIRATION W ULTRASOUND  2022     LARYNGOSCOPY, BRONCHOSCOPY, COMBINED N/A 3/4/2022    Procedure: Direct laryngoscopy;  Surgeon: Janet Kidd MD;  Location: [facility] OR     lump removal      right upper back     TONSILLECTOMY, ADENOIDECTOMY, COMBINED       CURRENT MEDICATIONS:   Current Outpatient Medications:      amoxicillin-clavulanate (AUGMENTIN) 875-125 MG  tablet, Take 1 tablet by mouth 2 times daily for 7 days, Disp: 14 tablet, Rfl: 0     oxyCODONE (ROXICODONE) 5 MG/5ML solution, Take 5 mLs (5 mg) by mouth every 6 hours as needed for pain or moderate to severe pain, Disp: 100 mL, Rfl: 0     senna-docusate (SENOKOT-S/PERICOLACE) 8.6-50 MG tablet, Take 1 tablet by mouth 2 times daily for 7 days, Disp: 14 tablet, Rfl: 0    ALLERGIES: Patient has no known allergies.    SOCIAL HISTORY:    Social History     Socioeconomic History     Marital status: Single     Spouse name: Not on file     Number of children: Not on file     Years of education: Not on file     Highest education level: Not on file   Occupational History     Not on file   Tobacco Use     Smoking status: Never Smoker     Smokeless tobacco: Never Used   Vaping Use     Vaping Use: Never used   Substance and Sexual Activity     Alcohol use: Yes     Comment: social     Drug use: Never     Sexual activity: Yes     Partners: Female     Birth control/protection: None   Other Topics Concern     Parent/sibling w/ CABG, MI or angioplasty before 65F 55M? Not Asked   Social History Narrative     Not on file     Social Determinants of Health     Financial Resource Strain: Not on file   Food Insecurity: Not on file   Transportation Needs: Not on file   Physical Activity: Not on file   Stress: Not on file   Social Connections: Not on file   Intimate Partner Violence: Not on file   Housing Stability: Not on file       FAMILY HISTORY:   Family History   Problem Relation Age of Onset     Anxiety Disorder Mother      Prostate Cancer Maternal Grandfather      Cancer Paternal Grandfather       Non-contributory for problems with anesthesia    REVIEW OF SYSTEMS:   The patient was asked a 14 point review of systems regarding constitutional symptoms, eye symptoms, ears, nose, mouth, throat symptoms, cardiovascular symptoms, respiratory symptoms, gastrointestinal symptoms, genitourinary symptoms, musculoskeletal symptoms, integumentary  symptoms, neurological symptoms, psychiatric symptoms, endocrine symptoms, hematologic/lymphatic symptoms, and allergic/ immunologic symptoms.   The pertinent factors have been included in the HPI and below.  Patient Supplied Answers to Review of Systems  No flowsheet data found.    Physical Examination   The patient underwent a physical examination as described below. The pertinent positive and negative findings are summarized after the description of the examination.  Constitutional: The patient's developmental and nutritional status was assessed. The patient's voice quality was assessed.  Head and Face: The head and face were inspected for deformities. The sinuses were palpated. The salivary glands were palpated. Facial muscle strength was assessed bilaterally.  Eyes: Extraocular movements and primary gaze alignment were assessed.  Ears, Nose, Mouth and Throat: The ears and nose were examined for deformities. The nasal septum, mucosa, and turbinates were inspected by anterior rhinoscopy. The lips, teeth, and gums were examined for abnormalities. The oral mucosa, tongue, palate, tonsils, lateral and posterior pharynx were inspected for the presence of asymmetry or mucosal lesions.    Neck: The tracheal position was noted, and the neck mass palpated to determine if there were any asymmetries, abnormal neck masses, thyromegally, or thyroid nodules.  Respiratory: The nature of the breathing and chest expansion/symmetry was observed.  Cardiovascular: The patient was examined to determine the presence of any edema or jugular venous distension.  Abdomen: The contour of the abdomen was noted.  Lymphatic: The patient was examined for infraclavicular lymphadenopathy.  Musculoskeletal: The patient was inspected for the presence of skeletal deformities.  Extremities: The extremities were examined for any clubbing or cyanosis.  Skin: The skin was examined for inflammatory or neoplastic conditions.  Neurologic: The patient's  orientation, mood, and affect were noted. The cranial nerve  functions were examined.  Other pertinent positive and negative findings on physical examination:   OC/OP: no lesions, uvula midline, soft palate elevates symmetrically, CN XII is normal  Neck: no lesions, incision healing well with steri strips  All other physical examination findings were within normal limits and noncontributory.    Procedures   Flexible laryngoscopy (CPT 79823)      Pre-procedure diagnosis: dysphagia  Post-procedure diagnosis: same as above  Indication for procedure: Mr. Thompson is a 41 year old male with see above  Procedure(s): Fiberoptic Laryngoscopy    Details of Procedure: After informed consent was obtained, the patient was seated in the examination chair.  The areas of the nasopharynx as well as the hypopharynx were anesthetized with topical 4% lidocaine with 0.25% phenylephrine atomizer.  Examination of the base of tongue was performed first.  Attention was directed to any evidence of masses in the area or evidence of leukoplakia or candidal infection.  Attention was directed to the epiglottis where its size and position was determined and its movement on phonation of the vowel  e .  The piriform sinuses were then inspected for any mass lesions or pooling of secretions.  Attention was then directed to the larynx. The vocal folds were inspected for infection or any areas of leukoplakia, for masses, polypoid degeneration, or hemorrhage.  Having done this, the arytenoids and vocal processes were inspected for erythema or evidence of granuloma formation.  The posterior commissure was then inspected for evidence of inflammatory changes in the mucosa and heaping up of mucosal tissue. The patient was then instructed to say the vowel  e .  Adduction of vocal folds to the midline was observed for any evidence of paresis or paralysis of the larynx or asymmetry in rotation of the larynx to the left or right. The patient was asked to  breathe and the degree of abduction was noted bilaterally.  Subglottic view of the larynx was obtained for any additional mass lesions or mucosal changes.  Finally the post cricoid was examined for evidence of pooling of secretions, as well as the pharyngeal wall mucosa.   Anesthesia type: 0.25% phenylephrine    Findings:  Anatomic/physiological deviations: RNC, mild saliva, mild vocal fold edema on the left   Right vocal process: No restriction of mobility   Left vocal process: No restriction of mobility  Glottal gap: Complete glottal closure  Supraglottic structures: Normal  Hypopharynx: Normal     Estimated Blood Loss: minimal  Complications: None  Disposition: Patient tolerated the procedure well        Review of Relevant Clinical Data   I personally reviewed:  Pathology 3/4/22    Thyroglossal duct cyst, excision:  -Benign multilocular cyst with respiratory epithelium, consistent with thyroglossal duct cyst  -Associated benign thyroid tissue  -Benign thyroid bone          Labs:  No results found for: TSH  No results found for: NA, CO2, BUN, CREAT, GLUCOSE, PHOS  Lab Results   Component Value Date    WBC 4.8 01/06/2022    HGB 16.0 02/17/2022    HCT 45.4 01/06/2022    MCV 88 01/06/2022     01/06/2022     Lab Results   Component Value Date    INR 1.08 01/06/2022     No results found for: CORWIN  No components found for: RHEUMATOIDFACTOR,  RF  No results found for: CRP  No components found for: CKTOT, URICACID  No components found for: C3, C4, DSDNAAB, NDNAABIFA  No results found for: MPOAB    Patient reported Quality of Life (QOL) Measures   Patient Supplied Answers To VHI Questionnaire  No flowsheet data found.    Patient Supplied Answers To EAT Questionnaire  No flowsheet data found.    Patient Supplied Answers To CSI Questionnaire  No flowsheet data found.    Patient Supplied Answers to Dyspnea Index Questionnaire:  No flowsheet data found.    Impression & Plan     IMPRESSION: Mr. Thompson is a 41 year old  male who is being seen for the followin. Neck mass  - started after right neck lymph node got large  - happened 2 months when sick with a cold  - had pain around it   - now gone but feels a pressure   - CT neck 21 with midline neck mass and right mixed laryngocele  - scope does not show false vocal fold swelling  - discussed he has both the cyst and the laryngocele  - discussed proceeding with a FNA for the cyst and then deciding on observation vs removal  - s/p sistrunk procedure 3/4/22  - pathology consistent with thyroglossal duct cyst  - symptoms 3/11/22 are improved  - scope 3/11/22 mild saliva and swelling  - discussed continuing restrictions for up to 6 weeks and scar takes up to 1 year to heal  - discussed proceeding wit CT neck after 6 weeks to see if laryngocele is still present, would like to hold off since he has no indication to remove it  Plan  - continue wound care        RETURN VISIT: as needed in 6 weeks       Scribe Preparation Attestation:  I, Liya Castillo, a scribe, prepared the chart for today's encounter.         Janet Kidd MD    Laryngology    [facility]  Department of  Otolaryngology - Head and Neck Surgery  Clinics & Surgery Center  [address] [place] [address]  Appointment line: [phone]  Fax: [phone]  [email or web address]           Again, thank you for allowing me to participate in the care of your patient.      Sincerely,    Janet Kidd MD

## 2022-03-11 NOTE — PATIENT INSTRUCTIONS
1.  You were seen in the ENT Clinic today by Dr. Kidd.     2. Your next steps:   - Continue wound care    3.  Plan is to return to clinic as needed in 6 weeks.    If you have any questions or concerns after your appointment, please call the clinic:   - Clinic phone: [phone].   - Annelise (Dr. Kidd's nurse): [phone]    Annelise Nails RN, BSN  [facility]  Department of Otolaryngology  [facility]  [email or web address]

## 2022-03-11 NOTE — LETTER
Date:March 14, 2022      Patient was self referred, no letter generated. Do not send.        [facility] Health Information

## 2022-03-13 ENCOUNTER — HEALTH MAINTENANCE LETTER (OUTPATIENT)
Age: 42
End: 2022-03-13

## 2023-04-23 ENCOUNTER — HEALTH MAINTENANCE LETTER (OUTPATIENT)
Age: 43
End: 2023-04-23

## 2024-06-30 ENCOUNTER — HEALTH MAINTENANCE LETTER (OUTPATIENT)
Age: 44
End: 2024-06-30

## (undated) DEVICE — ESU ELEC BLADE 2.75" COATED/INSULATED E1455

## (undated) DEVICE — ESU PENCIL SMOKE EVAC W/ROCKER SWITCH 0703-047-000

## (undated) DEVICE — SU SILK 4-0 TIE 12X30" A303H

## (undated) DEVICE — BLADE KNIFE SURG 15 371115

## (undated) DEVICE — LINEN GOWN LG 5406

## (undated) DEVICE — SU VICRYL 3-0 SH 27" UND J416H

## (undated) DEVICE — ENDO TOOTH QUICK GUARD CONFORMING PROTECTION

## (undated) DEVICE — SUCTION MANIFOLD NEPTUNE 2 SYS 4 PORT 0702-020-000

## (undated) DEVICE — SU SILK 3-0 TIE 12X30" A304H

## (undated) DEVICE — RETR ELASTIC STAYS LONE STAR BLUNT DUAL LEAD 3550-1G

## (undated) DEVICE — SU VICRYL 4-0 RB-1 27" UND J214H

## (undated) DEVICE — SU MONOCRYL 5-0 P-3 18" UND Y493G

## (undated) DEVICE — SPONGE LAP 18X18" X8435

## (undated) DEVICE — SPONGE KITTNER 30-101

## (undated) DEVICE — SU SILK 2-0 SH CR 5X18" C0125

## (undated) DEVICE — SU SILK 2-0 TIE 12X30" A305H

## (undated) DEVICE — Device

## (undated) DEVICE — CONNECTOR OMNI-FLEX 3222

## (undated) DEVICE — PREP SKIN SCRUB TRAY 4461A

## (undated) DEVICE — DRAIN JACKSON PRATT CHANNEL 10FR RND SIL W/TROCAR JP-2227

## (undated) DEVICE — SUCTION SLEEVE NEPTUNE 2 125MM 0703-005-125

## (undated) DEVICE — LINEN TOWEL PACK X5 5464

## (undated) DEVICE — LINEN TOWEL PACK X30 5481

## (undated) DEVICE — CLIP HORIZON MED BLUE 002200

## (undated) DEVICE — ESU GROUND PAD ADULT W/CORD E7507

## (undated) DEVICE — SU ETHILON 3-0 PS-1 18" 1663H

## (undated) DEVICE — DRAIN JACKSON PRATT RESERVOIR 100ML SU130-1305

## (undated) DEVICE — PEN MARKING W/RULER DYNJSM04

## (undated) DEVICE — LINEN TOWEL PACK X6 WHITE 5487

## (undated) DEVICE — CLIP HORIZON SM RED WIDE SLOT 001201

## (undated) DEVICE — ESU LIGASURE DISSECTOR EXACT LF2019

## (undated) DEVICE — PACK NEURO MINOR UMMC SNE32MNMU4

## (undated) DEVICE — PACK ENT ENDOSCOPY UMMC

## (undated) DEVICE — PREP POVIDONE IODINE SOLUTION 10% 4OZ

## (undated) DEVICE — SOL NACL 0.9% IRRIG 1000ML BOTTLE 2F7124

## (undated) RX ORDER — CEFAZOLIN SODIUM 1 G/3ML
INJECTION, POWDER, FOR SOLUTION INTRAMUSCULAR; INTRAVENOUS
Status: DISPENSED
Start: 2022-03-04

## (undated) RX ORDER — EPHEDRINE SULFATE 50 MG/ML
INJECTION, SOLUTION INTRAMUSCULAR; INTRAVENOUS; SUBCUTANEOUS
Status: DISPENSED
Start: 2022-03-04

## (undated) RX ORDER — DEXAMETHASONE SODIUM PHOSPHATE 4 MG/ML
INJECTION, SOLUTION INTRA-ARTICULAR; INTRALESIONAL; INTRAMUSCULAR; INTRAVENOUS; SOFT TISSUE
Status: DISPENSED
Start: 2022-03-04

## (undated) RX ORDER — ACETAMINOPHEN 325 MG/10.15ML
LIQUID ORAL
Status: DISPENSED
Start: 2022-03-04

## (undated) RX ORDER — LIDOCAINE HYDROCHLORIDE 20 MG/ML
SOLUTION OROPHARYNGEAL
Status: DISPENSED
Start: 2021-11-23

## (undated) RX ORDER — OXYCODONE HCL 5 MG/5 ML
SOLUTION, ORAL ORAL
Status: DISPENSED
Start: 2022-03-04

## (undated) RX ORDER — OXYMETAZOLINE HYDROCHLORIDE 0.05 G/100ML
SPRAY NASAL
Status: DISPENSED
Start: 2022-03-04

## (undated) RX ORDER — LIDOCAINE HYDROCHLORIDE AND EPINEPHRINE 10; 10 MG/ML; UG/ML
INJECTION, SOLUTION INFILTRATION; PERINEURAL
Status: DISPENSED
Start: 2022-03-04

## (undated) RX ORDER — SODIUM CHLORIDE 9 MG/ML
INJECTION, SOLUTION INTRAVENOUS
Status: DISPENSED
Start: 2022-01-06

## (undated) RX ORDER — CEFAZOLIN SODIUM/WATER 2 G/20 ML
SYRINGE (ML) INTRAVENOUS
Status: DISPENSED
Start: 2022-03-04

## (undated) RX ORDER — LIDOCAINE HYDROCHLORIDE 10 MG/ML
INJECTION, SOLUTION EPIDURAL; INFILTRATION; INTRACAUDAL; PERINEURAL
Status: DISPENSED
Start: 2022-01-06

## (undated) RX ORDER — LIDOCAINE HYDROCHLORIDE 20 MG/ML
INJECTION, SOLUTION EPIDURAL; INFILTRATION; INTRACAUDAL; PERINEURAL
Status: DISPENSED
Start: 2022-03-04

## (undated) RX ORDER — FENTANYL CITRATE 50 UG/ML
INJECTION, SOLUTION INTRAMUSCULAR; INTRAVENOUS
Status: DISPENSED
Start: 2022-03-04

## (undated) RX ORDER — HYDROMORPHONE HCL IN WATER/PF 6 MG/30 ML
PATIENT CONTROLLED ANALGESIA SYRINGE INTRAVENOUS
Status: DISPENSED
Start: 2022-03-04

## (undated) RX ORDER — PROPOFOL 10 MG/ML
INJECTION, EMULSION INTRAVENOUS
Status: DISPENSED
Start: 2022-03-04